# Patient Record
Sex: MALE | Race: WHITE | URBAN - METROPOLITAN AREA
[De-identification: names, ages, dates, MRNs, and addresses within clinical notes are randomized per-mention and may not be internally consistent; named-entity substitution may affect disease eponyms.]

---

## 2017-01-23 DIAGNOSIS — E10.8 TYPE 1 DIABETES MELLITUS WITH COMPLICATION (H): Primary | ICD-10-CM

## 2017-01-23 RX ORDER — LOSARTAN POTASSIUM 100 MG/1
100 TABLET ORAL DAILY
Qty: 90 TABLET | Refills: 1 | Status: SHIPPED | OUTPATIENT
Start: 2017-01-23 | End: 2017-06-16

## 2017-01-23 NOTE — TELEPHONE ENCOUNTER
losartan (COZAAR) 100 MG tablet    Last Written Prescription Date:  8/10/16  Last Fill Quantity: 90,   # refills: 1  Last Office Visit with G, P or [facility] prescribing provider: 4/14/15  Future Office visit:   7/3/17    Routing refill request to provider for review/approval because:   losartan (COZAAR) 100 MG tablet.  lv > 1 yr. F/u appt 7/3/17. rf?

## 2017-06-02 ENCOUNTER — TELEPHONE (OUTPATIENT)
Dept: ENDOCRINOLOGY | Facility: CLINIC | Age: 52
End: 2017-06-02

## 2017-06-02 DIAGNOSIS — E10.9 TYPE 1 DIABETES MELLITUS (H): ICD-10-CM

## 2017-06-02 RX ORDER — HUMAN INSULIN 100 [IU]/ML
INJECTION, SUSPENSION SUBCUTANEOUS
Qty: 10 ML | Refills: 0 | Status: SHIPPED | OUTPATIENT
Start: 2017-06-02 | End: 2017-06-16

## 2017-06-02 NOTE — TELEPHONE ENCOUNTER
----- Message from Cristina Xiang sent at 6/2/2017  3:39 PM CDT -----  Regarding: RX Refill request  Contact: [phone]  PT called today and said he had his pharmacy send over a refill request on for his NOVOLIN N VIAL 100 UNIT/ML susp and they informed him that he had to make an office visit before he would be approved for more fill. PT did have an appt set with Dr. Fleming on 7/3 and said he would be running out of his Insulin before that. I reschedule the 7/3 appt because PT was going to be out of town to next available opening in Dec and got him scheduled to see Lauren Palomino on 6/16 for the mean time but PT said he will still run out of insulin before then and is hoping to get a refill to get him through to 6/16 appt. PT is now using [facility] [address] [place] [address] [address] AT [facility] [address] Fax# [phone] and would like it sent there. PT requested a call back to confirm at [phone].    Thank You!  [facility]    Please DO NOT send this message and/or reply back to sender.  Call Center Representatives DO NOT respond to messages.

## 2017-06-02 NOTE — TELEPHONE ENCOUNTER
Lauren Palomino sent in 1 vial of NPH into [facility] and he will come in 6/16/17 for visit.

## 2017-06-16 ENCOUNTER — OFFICE VISIT (OUTPATIENT)
Dept: ENDOCRINOLOGY | Facility: CLINIC | Age: 52
End: 2017-06-16

## 2017-06-16 VITALS
DIASTOLIC BLOOD PRESSURE: 80 MMHG | SYSTOLIC BLOOD PRESSURE: 132 MMHG | BODY MASS INDEX: 33.04 KG/M2 | HEART RATE: 81 BPM | WEIGHT: 267.9 LBS

## 2017-06-16 DIAGNOSIS — E10.65 TYPE 1 DIABETES MELLITUS WITH HYPERGLYCEMIA (H): ICD-10-CM

## 2017-06-16 DIAGNOSIS — E10.8 TYPE 1 DIABETES MELLITUS WITH COMPLICATION (H): ICD-10-CM

## 2017-06-16 DIAGNOSIS — E10.65 TYPE 1 DIABETES MELLITUS WITH HYPERGLYCEMIA (H): Primary | ICD-10-CM

## 2017-06-16 LAB
CREAT SERPL-MCNC: 0.8 MG/DL (ref 0.66–1.25)
CREAT UR-MCNC: 37 MG/DL
GFR SERPL CREATININE-BSD FRML MDRD: NORMAL ML/MIN/1.7M2
HBA1C MFR BLD: 7.1 % (ref 4.3–6)
MICROALBUMIN UR-MCNC: <5 MG/L
MICROALBUMIN/CREAT UR: NORMAL MG/G CR (ref 0–17)
POTASSIUM SERPL-SCNC: 3.4 MMOL/L (ref 3.4–5.3)
T4 FREE SERPL-MCNC: 0.95 NG/DL (ref 0.76–1.46)
TSH SERPL DL<=0.05 MIU/L-ACNC: 2.74 MU/L (ref 0.4–4)

## 2017-06-16 RX ORDER — LOSARTAN POTASSIUM 100 MG/1
100 TABLET ORAL DAILY
Qty: 90 TABLET | Refills: 3 | Status: SHIPPED | OUTPATIENT
Start: 2017-06-16 | End: 2018-06-29

## 2017-06-16 RX ORDER — BLOOD-GLUCOSE METER
EACH MISCELLANEOUS
Qty: 1 KIT | Refills: 0 | Status: SHIPPED | OUTPATIENT
Start: 2017-06-16 | End: 2018-10-09

## 2017-06-16 RX ORDER — LANCETS 33 GAUGE
1 EACH MISCELLANEOUS 4 TIMES DAILY
Qty: 400 EACH | Refills: 3 | Status: SHIPPED | OUTPATIENT
Start: 2017-06-16

## 2017-06-16 RX ORDER — HUMAN INSULIN 100 [IU]/ML
INJECTION, SUSPENSION SUBCUTANEOUS
Qty: 10 ML | Refills: 3 | Status: SHIPPED | OUTPATIENT
Start: 2017-06-16 | End: 2017-09-06

## 2017-06-16 RX ORDER — NAPROXEN SODIUM 220 MG
TABLET ORAL
Qty: 100 EACH | Refills: 11 | Status: SHIPPED | OUTPATIENT
Start: 2017-06-16 | End: 2018-12-03

## 2017-06-16 NOTE — PROGRESS NOTES
HPI  Pascual Swanson is a 52 year old male with type 1 diabetes mellitus here today for a follow up visit.  He has not been seen in our clinic since 2015.  He saw Dr. Milton Fleming in 4/2015.  Pt states he was unemployed for awhile and was unable to afford to go to his appointments.  Pt states he was dx having type 1 diabetes mellitus at age 29.  He denies hx of retinopathy, nephropathy and neuropathy.  For his diabetes, he is using NPH and Novolog insulin.  He states he can not afford Lantus or the other basal insulins and he is unable to afford an insulin pump at this time.  He states he is taking NPH 30 units SQ each am and 30 units SQ at bedtime, along with Novolog 14 units with meals and he also takes Novolog for snacks and correction insulin.  He has no glucose meter today.  His A1C is 7.1 % today.  He had an A1C done at [facility] on 5/24/2017 which was 7.7 %.  He denies hx of frequent hypoglycemia.  He states his FBS are in the 140-150 range and his blood sugars later in the day are 100-120 range.  On ROS today, weight is down 10 lbs over the past 2 months which was intentional with dietary changes and exercise.  He has been swimming 3 times per week.  Eyes are dry and irritated since swimming. No itchy eyes.  Lower extremity edema ( > right ) for approx 1 year with increase swelling recently for the past 3 months.  He was seen by primary care physician 2 weeks ago and had tests done including cardiac echo which was normal per patient.  He was prescribed Lasix and swelling reduced and then returned after he stopped the Lasix and he is now back on Lasix.  No warm or drainage or fevers right lower leg.  He reports chronic cough from allergies.  Pt has hx of sleep apnea and will be seeing his sleep apnea staff next week.  Pt denies n/v, SOB at rest, chest pain, abd pain, diarrhea, dysuria or hematuria.  No numbness, tingling or pain in feet or hands.  No fevers.    Diabetes Care  Retinopathy:none; pt seen by Oph 1  "year ago.  Nephropathy:none. He is taking Cozaar.  Neuropathy:none.  Foot Exam:no ulcers. LE edema ( > right ). Sensory intact.  Taking aspirin:no.  Lipids:LDL was 50 on 5/242017 at [facility].    ROS  Please see under HPI.    Allergies  Allergies   Allergen Reactions     Bactrim [Sulfamethoxazole W/Trimethoprim] Hives     Hives in eyes     Dust Mites        Medications  Current Outpatient Prescriptions   Medication Sig Dispense Refill     NOVOLIN N VIAL 100 UNIT/ML susp 22 units before breakfast 30 units before bedtime 10 mL 0     losartan (COZAAR) 100 MG tablet Take 1 tablet (100 mg) by mouth daily 90 tablet 1     insulin aspart (NOVOLOG VIAL) 100 UNITS/ML VIAL Use in pump basals plus corrections Approx 130 units daily needs to be seen for future fills 12 vial 1     insulin syringe 31G X 5/16\" 0.5 ML MISC Use twice daily 100 each 11     blood glucose (TRINA CONTOUR NEXT) test strip For use with insulin pump, patient testing 10x/day 300 Month 12     acetaminophen (TYLENOL) 325 MG tablet Take 2 tablets (650 mg) by mouth every 4 hours as needed for other (mild pain) (Patient not taking: Reported on 6/16/2017) 100 tablet 0     oxyCODONE (ROXICODONE) 5 MG immediate release tablet Take 1-2 tablets (5-10 mg) by mouth every 3 hours as needed for pain or other (Moderate to Severe) (Patient not taking: Reported on 6/16/2017) 40 tablet 0     senna-docusate (SENOKOT-S;PERICOLACE) 8.6-50 MG per tablet Take 1-2 tablets by mouth 2 times daily Take while on oral narcotics to prevent or treat constipation. (Patient not taking: Reported on 6/16/2017) 30 tablet 0     Naproxen Sodium (ALEVE PO) Take 220 mg by mouth as needed for moderate pain       NOVOLOG FLEXPEN 100 UNIT/ML soln Take 12-18 units before meals 1 Month 11     insulin pen needle 31G X 5 MM Use pen needles daily or as directed. (Patient not taking: Reported on 6/16/2017) 100 each 11     ACCU-CHEK FIOR test strip Use to test blood sugar 4 times daily or as directed. 120 " strip 11     omeprazole (PRILOSEC OTC) 20 MG tablet Take by mouth every evening        glucose blood VI test strips (ACCU-CHEK SMARTVIEW) strip Test 4 times daily 400 strip 3       Family History  family history is negative for Anesthesia Reaction.    Social History  Smoke: none.  He has 4 daughters.  He is working- sales/desk job.    Past Medical History  Past Medical History:   Diagnosis Date     Alcohol abuse, in remission      GERD (gastroesophageal reflux disease)      Hypertension      Sleep apnea      Type 1 diabetes mellitus (H)      Past Surgical History:   Procedure Laterality Date     APPENDECTOMY  Age 13     EXCISE GANGLION FOOT Right 4/6/2015    Procedure: EXCISE GANGLION FOOT;  Surgeon: Neto Crabtree MD;  Location: [facility] OR     EXTRACTION(S) DENTAL       HERNIA REPAIR  Age 3     IRRIGATION AND DEBRIDEMENT FOOT, COMBINED Right 5/11/2015    Procedure: COMBINED IRRIGATION AND DEBRIDEMENT FOOT;  Surgeon: Neto Crabtree MD;  Location: [facility] OR       Physical Exam  BP (!) 153/94  Pulse 81  Wt 121.5 kg (267 lb 14.4 oz)  BMI 33.04 kg/m2  Body mass index is 33.04 kg/(m^2).    GENERAL : In no apparent distress  EXTREMITIES: LE edema b/l ( > right ). No warmth or drainage.  No calf tenderness and no foot ulcers.  Sensory intact.      RESULTS  Creatinine   Date Value Ref Range Status   04/14/2015 0.86 0.66 - 1.25 mg/dL Final     GFR Estimate   Date Value Ref Range Status   04/14/2015 >90  Non  GFR Calc   >60 mL/min/1.7m2 Final     Hemoglobin A1C   Date Value Ref Range Status   03/23/2015 9.1 (H) 4.1 - 5.7 % Final     Potassium   Date Value Ref Range Status   04/14/2015 3.3 (L) 3.4 - 5.3 mmol/L Final     TSH   Date Value Ref Range Status   04/14/2015 2.11 0.40 - 4.00 mU/L Final       Cholesterol   Date Value Ref Range Status   11/21/2013 140 0 - 200 mg/dL Final     Comment:     LDL Cholesterol is the primary guide to therapy.   The NCEP recommends further evaluation of: patients  with cholesterol greater   than 200 mg/dL if additional risk factors are present, cholesterol greater   than   240 mg/dL, triglycerides greater than 150 mg/dL, or HDL less than 40 mg/dL.   06/30/2011 150 0 - 200 mg/dL Final     Comment:     LDL Cholesterol is the primary guide to therapy.   The NCEP recommends further evaluation of: patients with cholesterol greater   than 200 mg/dL if additional risk factors are present, cholesterol greater   than   240 mg/dL, triglycerides greater than 150 mg/dL, or HDL less than 40 mg/dL.     HDL Cholesterol   Date Value Ref Range Status   11/21/2013 53 40 - 110 mg/dL Final   06/30/2011 71 40 - 110 mg/dL Final     LDL Cholesterol Calculated   Date Value Ref Range Status   11/21/2013 66 0 - 129 mg/dL Final     Comment:     LDL Cholesterol is the primary guide to therapy: LDL-cholesterol goal in high   risk patients is <100 mg/dL and in very high risk patients is <70 mg/dL.   06/30/2011 67 0 - 129 mg/dL Final     Comment:     LDL Cholesterol is the primary guide to therapy: LDL-cholesterol goal in high   risk patients is <100 mg/dL and in very high risk patients is <70 mg/dL.     LDL Cholesterol Direct   Date Value Ref Range Status   04/14/2015 53 0 - 129 mg/dL Final     Comment:     Optimal:         <100 mg/dL   Near Optimal:     100-129 mg/dL   Borderline High:  130-159 mg/dL   High:             160-189 mg/dL   Very high:  greater than or equal to 190 mg/dL   Cannot estimate LDL when triglyceride exceeds 400 mg/dL       Triglycerides   Date Value Ref Range Status   11/21/2013 107 0 - 150 mg/dL Final     Comment:     Fasting specimen   06/30/2011 61 0 - 150 mg/dL Final     Comment:     Fasting specimen     Cholesterol/HDL Ratio   Date Value Ref Range Status   11/21/2013 2.7 0.0 - 5.0 Final   06/30/2011 2.1 0.0 - 5.0 Final     A1C   7.1 % today.    ASSESSMENT/PLAN:    1. TYPE 1 DIABETES MELLITUS: Pt did not bring his glucose meter today, so I have no blood sugar data.  His A1C is  7.1 % today.  He is using NPH for financial reasons. He can not afford Lantus or other basal insulins at this time.  Pt is to remain on current insulin doses.  He is to schedule an Oph exam.  Feet are without ulcers today.  I placed an order for labs today including a urine microalbuminuria.  His LDL was 50 on 5/24/2017 at [facility].  He is not taking a statin.  Check TSH/FT4 today.  I placed a referral for pt to see CDE and dietitian.    2. HTN:  /80.  He is taking Cozaar and Lasix.  Consider switching to Hyzaar.  Checking creat/GFR and K+ today.    3.  OBESITY: He has lost weight with dietary changes and exercise.    4.  LE EDEMA: Pt seen at [facility] for this issue and has f/u .  He did have an echo done.  Suggested that right lower leg US may need to be done.  He is taking Lasix.    5.  Return to Endocrine Clinic to see me in 3 months and he will be seeing Dr. Zoey Fleming in Dec 2017.

## 2017-06-16 NOTE — LETTER
6/16/2017       RE: Pascual Swanson  [address] [place] [address]     Dear Colleague,    Thank you for referring your patient, Pascual Swanson, to the [facility] ENDOCRINOLOGY at [facility]. Please see a copy of my visit note below.    DANIEL Swanson is a 52 year old male with type 1 diabetes mellitus here today for a follow up visit.  He has not been seen in our clinic since 2015.  He saw Dr. Milton Fleming in 4/2015.  Pt states he was unemployed for awhile and was unable to afford to go to his appointments.  Pt states he was dx having type 1 diabetes mellitus at age 29.  He denies hx of retinopathy, nephropathy and neuropathy.  For his diabetes, he is using NPH and Novolog insulin.  He states he can not afford Lantus or the other basal insulins and he is unable to afford an insulin pump at this time.  He states he is taking NPH 30 units SQ each am and 30 units SQ at bedtime, along with Novolog 14 units with meals and he also takes Novolog for snacks and correction insulin.  He has no glucose meter today.  His A1C is 7.1 % today.  He had an A1C done at [facility] on 5/24/2017 which was 7.7 %.  He denies hx of frequent hypoglycemia.  He states his FBS are in the 140-150 range and his blood sugars later in the day are 100-120 range.  On ROS today, weight is down 10 lbs over the past 2 months which was intentional with dietary changes and exercise.  He has been swimming 3 times per week.  Eyes are dry and irritated since swimming. No itchy eyes.  Lower extremity edema ( > right ) for approx 1 year with increase swelling recently for the past 3 months.  He was seen by primary care physician 2 weeks ago and had tests done including cardiac echo which was normal per patient.  He was prescribed Lasix and swelling reduced and then returned after he stopped the Lasix and he is now back on Lasix.  No warm or drainage or fevers right lower leg.  He reports chronic cough from  "allergies.  Pt has hx of sleep apnea and will be seeing his sleep apnea staff next week.  Pt denies n/v, SOB at rest, chest pain, abd pain, diarrhea, dysuria or hematuria.  No numbness, tingling or pain in feet or hands.  No fevers.    Diabetes Care  Retinopathy:none; pt seen by Oph 1 year ago.  Nephropathy:none. He is taking Cozaar.  Neuropathy:none.  Foot Exam:no ulcers. LE edema ( > right ). Sensory intact.  Taking aspirin:no.  Lipids:LDL was 50 on 5/242017 at [facility].    ROS  Please see under HPI.    Allergies  Allergies   Allergen Reactions     Bactrim [Sulfamethoxazole W/Trimethoprim] Hives     Hives in eyes     Dust Mites        Medications  Current Outpatient Prescriptions   Medication Sig Dispense Refill     NOVOLIN N VIAL 100 UNIT/ML susp 22 units before breakfast 30 units before bedtime 10 mL 0     losartan (COZAAR) 100 MG tablet Take 1 tablet (100 mg) by mouth daily 90 tablet 1     insulin aspart (NOVOLOG VIAL) 100 UNITS/ML VIAL Use in pump basals plus corrections Approx 130 units daily needs to be seen for future fills 12 vial 1     insulin syringe 31G X 5/16\" 0.5 ML MISC Use twice daily 100 each 11     blood glucose (TRINA CONTOUR NEXT) test strip For use with insulin pump, patient testing 10x/day 300 Month 12     acetaminophen (TYLENOL) 325 MG tablet Take 2 tablets (650 mg) by mouth every 4 hours as needed for other (mild pain) (Patient not taking: Reported on 6/16/2017) 100 tablet 0     oxyCODONE (ROXICODONE) 5 MG immediate release tablet Take 1-2 tablets (5-10 mg) by mouth every 3 hours as needed for pain or other (Moderate to Severe) (Patient not taking: Reported on 6/16/2017) 40 tablet 0     senna-docusate (SENOKOT-S;PERICOLACE) 8.6-50 MG per tablet Take 1-2 tablets by mouth 2 times daily Take while on oral narcotics to prevent or treat constipation. (Patient not taking: Reported on 6/16/2017) 30 tablet 0     Naproxen Sodium (ALEVE PO) Take 220 mg by mouth as needed for moderate pain       NOVOLOG " FLEXPEN 100 UNIT/ML soln Take 12-18 units before meals 1 Month 11     insulin pen needle 31G X 5 MM Use pen needles daily or as directed. (Patient not taking: Reported on 6/16/2017) 100 each 11     ACCU-CHEK FIOR test strip Use to test blood sugar 4 times daily or as directed. 120 strip 11     omeprazole (PRILOSEC OTC) 20 MG tablet Take by mouth every evening        glucose blood VI test strips (ACCU-CHEK SMARTVIEW) strip Test 4 times daily 400 strip 3       Family History  family history is negative for Anesthesia Reaction.    Social History  Smoke: none.  He has 4 daughters.  He is working- sales/desk job.    Past Medical History  Past Medical History:   Diagnosis Date     Alcohol abuse, in remission      GERD (gastroesophageal reflux disease)      Hypertension      Sleep apnea      Type 1 diabetes mellitus (H)      Past Surgical History:   Procedure Laterality Date     APPENDECTOMY  Age 13     EXCISE GANGLION FOOT Right 4/6/2015    Procedure: EXCISE GANGLION FOOT;  Surgeon: Neto Crabtree MD;  Location: [facility]     EXTRACTION(S) DENTAL       HERNIA REPAIR  Age 3     IRRIGATION AND DEBRIDEMENT FOOT, COMBINED Right 5/11/2015    Procedure: COMBINED IRRIGATION AND DEBRIDEMENT FOOT;  Surgeon: Neto Crabtree MD;  Location: [facility] OR       Physical Exam  BP (!) 153/94  Pulse 81  Wt 121.5 kg (267 lb 14.4 oz)  BMI 33.04 kg/m2  Body mass index is 33.04 kg/(m^2).    GENERAL : In no apparent distress  EXTREMITIES: LE edema b/l ( > right ). No warmth or drainage.  No calf tenderness and no foot ulcers.  Sensory intact.      RESULTS  Creatinine   Date Value Ref Range Status   04/14/2015 0.86 0.66 - 1.25 mg/dL Final     GFR Estimate   Date Value Ref Range Status   04/14/2015 >90  Non  GFR Calc   >60 mL/min/1.7m2 Final     Hemoglobin A1C   Date Value Ref Range Status   03/23/2015 9.1 (H) 4.1 - 5.7 % Final     Potassium   Date Value Ref Range Status   04/14/2015 3.3 (L) 3.4 - 5.3 mmol/L Final      TSH   Date Value Ref Range Status   04/14/2015 2.11 0.40 - 4.00 mU/L Final       Cholesterol   Date Value Ref Range Status   11/21/2013 140 0 - 200 mg/dL Final     Comment:     LDL Cholesterol is the primary guide to therapy.   The NCEP recommends further evaluation of: patients with cholesterol greater   than 200 mg/dL if additional risk factors are present, cholesterol greater   than   240 mg/dL, triglycerides greater than 150 mg/dL, or HDL less than 40 mg/dL.   06/30/2011 150 0 - 200 mg/dL Final     Comment:     LDL Cholesterol is the primary guide to therapy.   The NCEP recommends further evaluation of: patients with cholesterol greater   than 200 mg/dL if additional risk factors are present, cholesterol greater   than   240 mg/dL, triglycerides greater than 150 mg/dL, or HDL less than 40 mg/dL.     HDL Cholesterol   Date Value Ref Range Status   11/21/2013 53 40 - 110 mg/dL Final   06/30/2011 71 40 - 110 mg/dL Final     LDL Cholesterol Calculated   Date Value Ref Range Status   11/21/2013 66 0 - 129 mg/dL Final     Comment:     LDL Cholesterol is the primary guide to therapy: LDL-cholesterol goal in high   risk patients is <100 mg/dL and in very high risk patients is <70 mg/dL.   06/30/2011 67 0 - 129 mg/dL Final     Comment:     LDL Cholesterol is the primary guide to therapy: LDL-cholesterol goal in high   risk patients is <100 mg/dL and in very high risk patients is <70 mg/dL.     LDL Cholesterol Direct   Date Value Ref Range Status   04/14/2015 53 0 - 129 mg/dL Final     Comment:     Optimal:         <100 mg/dL   Near Optimal:     100-129 mg/dL   Borderline High:  130-159 mg/dL   High:             160-189 mg/dL   Very high:  greater than or equal to 190 mg/dL   Cannot estimate LDL when triglyceride exceeds 400 mg/dL       Triglycerides   Date Value Ref Range Status   11/21/2013 107 0 - 150 mg/dL Final     Comment:     Fasting specimen   06/30/2011 61 0 - 150 mg/dL Final     Comment:     Fasting specimen      Cholesterol/HDL Ratio   Date Value Ref Range Status   11/21/2013 2.7 0.0 - 5.0 Final   06/30/2011 2.1 0.0 - 5.0 Final     A1C   7.1 % today.    ASSESSMENT/PLAN:    1. TYPE 1 DIABETES MELLITUS: Pt did not bring his glucose meter today, so I have no blood sugar data.  His A1C is 7.1 % today.  He is using NPH for financial reasons. He can not afford Lantus or other basal insulins at this time.  Pt is to remain on current insulin doses.  He is to schedule an Oph exam.  Feet are without ulcers today.  I placed an order for labs today including a urine microalbuminuria.  His LDL was 50 on 5/24/2017 at [facility].  He is not taking a statin.  Check TSH/FT4 today.  I placed a referral for pt to see CDE and dietitian.    2. HTN:  /80.  He is taking Cozaar and Lasix.  Consider switching to Hyzaar.  Checking creat/GFR and K+ today.    3.  OBESITY: He has lost weight with dietary changes and exercise.    4.  LE EDEMA: Pt seen at [facility] for this issue and has f/u .  He did have an echo done.  Suggested that right lower leg US may need to be done.  He is taking Lasix.    5.  Return to Endocrine Clinic to see me in 3 months and he will be seeing Dr. Zoey Fleming in Dec 2017.      Lauren Palomino PA-C

## 2017-06-16 NOTE — PATIENT INSTRUCTIONS
1.  Check your blood sugar fasting each am and premeals DAILY.  Bring glucose meter to appointments.  2.  Continue current insulin doses for now.  3.  Schedule eye appointment.  4.  See our diabetes educator.  5.  See your primary care physician for follow up for lower leg edema.  6.  Labs today- I will send you a result letter.  Lauren Palomino PA-C

## 2017-06-16 NOTE — MR AVS SNAPSHOT
After Visit Summary   6/16/2017    Pascual Swanson    MRN: [id number]           Patient Information     Date Of Birth          1965        Visit Information        Provider Department      6/16/2017 9:00 AM Lauren Palomino PA-C [facility] Endocrinology        Today's Diagnoses     Type 1 diabetes mellitus with hyperglycemia (H)    -  1    Type 1 diabetes mellitus with complication (H)          Care Instructions    1.  Check your blood sugar fasting each am and premeals DAILY.  Bring glucose meter to appointments.  2.  Continue current insulin doses for now.  3.  Schedule eye appointment.  4.  See our diabetes educator.  5.  See your primary care physician for follow up for lower leg edema.  6.  Labs today- I will send you a result letter.  Lauren Palomino PA-C          Follow-ups after your visit        Your next 10 appointments already scheduled     Jun 16, 2017  9:45 AM CDT   LAB with [facility]   [facility] Lab ([facility])    [address] [place] [id number] [phone]           Patient must bring picture ID.  Patient should be prepared to give a urine specimen  Please do not eat 10-12 hours before your appointment if you are coming in fasting for labs on lipids, cholesterol, or glucose (sugar).  Pregnant women should follow their Care Team instructions. Water with medications is okay. Do not drink coffee or other fluids.   If you have concerns about taking  your medications, please ask at office or if scheduling via [employer], send a message by clicking on Secure Messaging, Message Your Care Team.            Jul 14, 2017  8:30 AM CDT   (Arrive by 8:15 AM)   Office Visit with Amalia Lockwood RD   [facility] Diabetes ([facility])    [address] [place] [id number] [phone]           Bring a current list of meds and any records pertaining to this visit.  For Physicals, please bring  immunization records and any forms needing to be filled out.  Please arrive 10 minutes early to complete paperwork.            Aug 28, 2017  8:30 AM CDT   (Arrive by 8:15 AM)   Office Visit with Luciana Kenyon RN   [facility] Diabetes ([facility])    [address] [place], [place] [id number] [phone]           Bring a current list of meds and any records pertaining to this visit.  For Physicals, please bring immunization records and any forms needing to be filled out.  Please arrive 10 minutes early to complete paperwork.            Aug 28, 2017  9:30 AM CDT   (Arrive by 9:15 AM)   Office Visit with Amalia Lockwood RD   [facility] Diabetes ([facility])    [address] [place] [id number] [phone]           Bring a current list of meds and any records pertaining to this visit.  For Physicals, please bring immunization records and any forms needing to be filled out.  Please arrive 10 minutes early to complete paperwork.            Dec 04, 2017  2:30 PM CST   (Arrive by 2:15 PM)   RETURN DIABETES with Zoey Fleming MD   [facility] Endocrinology ([facility])    [address] [place] [id number] [phone]              Future tests that were ordered for you today     Open Future Orders        Priority Expected Expires Ordered    Potassium Routine  6/16/2018 6/16/2017    Creatinine Routine  6/16/2018 6/16/2017    TSH Routine  6/16/2018 6/16/2017    T4 free Routine  6/16/2018 6/16/2017            Who to contact     Please call your clinic at [phone] to:    Ask questions about your health    Make or cancel appointments    Discuss your medicines    Learn about your test results    Speak to your doctor   If you have compliments or concerns about an experience at your clinic, or if you wish to file a complaint, please contact [facility]  Patient Relations at [phone] or email us at [email or web address]         Additional Information About Your Visit        [employer] Information     [employer] is an electronic gateway that provides easy, online access to your medical records. With [employer], you can request a clinic appointment, read your test results, renew a prescription or communicate with your care team.     To sign up for [employer] visit the website at [email or web address]   You will be asked to enter the access code listed below, as well as some personal information. Please follow the directions to create your username and password.     Your access code is: [email or web address]  Expires: 9/3/2017  6:31 AM     Your access code will  in 90 days. If you need help or a new code, please contact your [facility] or call [phone] for assistance.        Care EveryWhere ID     This is your Care EveryWhere ID. This could be used by other organizations to access your [place] medical records  [id number]        Your Vitals Were     Pulse BMI (Body Mass Index)                81 33.04 kg/m2           Blood Pressure from Last 3 Encounters:   17 132/80   06/05/15 138/90   05/11/15 143/89    Weight from Last 3 Encounters:   17 121.5 kg (267 lb 14.4 oz)   06/05/15 115.5 kg (254 lb 9.6 oz)   05/11/15 113.6 kg (250 lb 7.1 oz)              We Performed the Following     Albumin Random Urine Quantitative          Today's Medication Changes          These changes are accurate as of: 17  9:41 AM.  If you have any questions, ask your nurse or doctor.               These medicines have changed or have updated prescriptions.        Dose/Directions    insulin aspart 100 UNITS/ML injection   Commonly known as:  NovoLOG VIAL   This may have changed:    - additional instructions  - Another medication with the same name was removed. Continue taking this medication, and follow the directions you see here.  "  Used for:  Type 1 diabetes mellitus with hyperglycemia (H)   Changed by:  Lauren Palomino PA-C        Inject premeals and for snacks and correction . Pt uses approx 60 units in 24 hrs.   Quantity:  6 vial   Refills:  3       NovoLIN N VIAL 100 UNIT/ML injection   This may have changed:  additional instructions   Used for:  Type 1 diabetes mellitus with hyperglycemia (H)   Generic drug:  insulin NPH   Changed by:  Lauren Palomino PA-C        Inject 30 units SQ each am and 30 units SQ at bedtime.   Quantity:  10 mL   Refills:  3         Stop taking these medicines if you haven't already. Please contact your care team if you have questions.     acetaminophen 325 MG tablet   Commonly known as:  TYLENOL   Stopped by:  Lauren Palomino PA-C           ALEVE PO   Stopped by:  Lauren Palomino PA-C           insulin pen needle 31G X 5 MM   Stopped by:  Lauren Palomino PA-C           oxyCODONE 5 MG IR tablet   Commonly known as:  ROXICODONE   Stopped by:  Lauren Palomino PA-C                Where to get your medicines      These medications were sent to [email or web address] [address] [facility] [address] [facility] [address]  [address] [facility] [address]    Hours:  24-hours Phone:  [phone]     insulin aspart 100 UNITS/ML injection    insulin syringe 31G X 5/16\" 0.5 ML Misc    losartan 100 MG tablet    NovoLIN N VIAL 100 UNIT/ML injection                Primary Care Provider    None Specified       No primary provider on file.        Thank you!     Thank you for choosing [facility] ENDOCRINOLOGY  for your care. Our goal is always to provide you with excellent care. Hearing back from our patients is one way we can continue to improve our services. Please take a few minutes to complete the written survey that you may receive in the mail after your visit with us. Thank you!             Your Updated Medication List - Protect others around you: " "Learn how to safely use, store and throw away your medicines at www.disposemymeds.org.          This list is accurate as of: 6/16/17  9:41 AM.  Always use your most recent med list.                   Brand Name Dispense Instructions for use    * blood glucose monitoring test strip    ACCU-CHEK SMARTVIEW    400 strip    Test 4 times daily       * ACCU-CHEK FIOR test strip   Generic drug:  blood glucose monitoring     120 strip    Use to test blood sugar 4 times daily or as directed.       * blood glucose monitoring test strip    TRINA CONTOUR NEXT    300 Month    For use with insulin pump, patient testing 10x/day       insulin aspart 100 UNITS/ML injection    NovoLOG VIAL    6 vial    Inject premeals and for snacks and correction . Pt uses approx 60 units in 24 hrs.       insulin syringe 31G X 5/16\" 0.5 ML Misc     100 each    Use twice daily       losartan 100 MG tablet    COZAAR    90 tablet    Take 1 tablet (100 mg) by mouth daily       NovoLIN N VIAL 100 UNIT/ML injection   Generic drug:  insulin NPH     10 mL    Inject 30 units SQ each am and 30 units SQ at bedtime.       priLOSEC OTC 20 MG tablet   Generic drug:  omeprazole      Take by mouth every evening       senna-docusate 8.6-50 MG per tablet    SENOKOT-S;PERICOLACE    30 tablet    Take 1-2 tablets by mouth 2 times daily Take while on oral narcotics to prevent or treat constipation.       * Notice:  This list has 3 medication(s) that are the same as other medications prescribed for you. Read the directions carefully, and ask your doctor or other care provider to review them with you.      "

## 2017-09-06 DIAGNOSIS — E10.65 TYPE 1 DIABETES MELLITUS WITH HYPERGLYCEMIA (H): ICD-10-CM

## 2017-09-06 RX ORDER — HUMAN INSULIN 100 [IU]/ML
INJECTION, SUSPENSION SUBCUTANEOUS
Qty: 10 ML | Refills: 3 | Status: SHIPPED | OUTPATIENT
Start: 2017-09-06 | End: 2017-09-07

## 2017-09-07 DIAGNOSIS — E10.9 TYPE 1 DIABETES MELLITUS (H): Primary | ICD-10-CM

## 2017-09-07 DIAGNOSIS — E10.9 DIABETES MELLITUS TYPE 1 (H): Primary | ICD-10-CM

## 2017-12-04 ENCOUNTER — OFFICE VISIT (OUTPATIENT)
Dept: ENDOCRINOLOGY | Facility: CLINIC | Age: 52
End: 2017-12-04

## 2017-12-04 VITALS
HEIGHT: 76 IN | BODY MASS INDEX: 31.78 KG/M2 | WEIGHT: 261 LBS | SYSTOLIC BLOOD PRESSURE: 158 MMHG | DIASTOLIC BLOOD PRESSURE: 95 MMHG

## 2017-12-04 DIAGNOSIS — I10 ESSENTIAL HYPERTENSION: ICD-10-CM

## 2017-12-04 DIAGNOSIS — E10.8 TYPE 1 DIABETES MELLITUS WITH COMPLICATION (H): Primary | ICD-10-CM

## 2017-12-04 LAB — HBA1C MFR BLD: 7.1 % (ref 4.3–6)

## 2017-12-04 RX ORDER — INSULIN GLARGINE 100 [IU]/ML
54 INJECTION, SOLUTION SUBCUTANEOUS DAILY
Qty: 30 ML | Refills: 3 | Status: SHIPPED | OUTPATIENT
Start: 2017-12-04 | End: 2018-07-11

## 2017-12-04 RX ORDER — CETIRIZINE HYDROCHLORIDE 10 MG/1
10 TABLET ORAL DAILY
COMMUNITY

## 2017-12-04 RX ORDER — LISDEXAMFETAMINE DIMESYLATE 10 MG/1
10 CAPSULE ORAL EVERY MORNING
COMMUNITY

## 2017-12-04 RX ORDER — DEXTROAMPHETAMINE SULFATE 10 MG/1
10 TABLET ORAL DAILY
COMMUNITY

## 2017-12-04 RX ORDER — AMLODIPINE BESYLATE 5 MG/1
5 TABLET ORAL DAILY
Qty: 90 TABLET | Refills: 3 | Status: SHIPPED | OUTPATIENT
Start: 2017-12-04 | End: 2018-11-08

## 2017-12-04 RX ORDER — FUROSEMIDE 20 MG/1
10 TABLET ORAL DAILY
COMMUNITY

## 2017-12-04 ASSESSMENT — PAIN SCALES - GENERAL: PAINLEVEL: NO PAIN (0)

## 2017-12-04 NOTE — NURSING NOTE
Chief Complaint   Patient presents with     RECHECK     F/U TYPE I DM     Audrey Neumann, CMA  Endocrinology & Diabetes 3G    Capillary Fingerstick performed for an Hemoglobin A1C test

## 2017-12-04 NOTE — MR AVS SNAPSHOT
After Visit Summary   12/4/2017    Pascual Swanson    MRN: [id number]           Patient Information     Date Of Birth          1965        Visit Information        Provider Department      12/4/2017 2:30 PM Zoey Fleming MD [facility] Endocrinology        Today's Diagnoses     Type 1 diabetes mellitus with complication (H)    -  1    Essential hypertension           Follow-ups after your visit        Additional Services     DIABETES EDUCATOR REFERRAL       DIABETES SELF MANAGEMENT TRAINING (DSMT)      Your provider has referred you to Diabetes Education: [facility]: Diabetes Education - [facility] [phone] [email or web address]    A  will call you to make your appointment. If it has been more than 3 business days since your referral was placed, please call the above phone number to schedule.    Type of training and number of hours: Previous Diagnosis: Follow-up DSMT - 2 hours.    Medicare covers: 10 hours of initial DSMT in 12 month period from the time of first visit, plus 2 hours of follow-up DSMT annually, and additional hours as requested for insulin training.    Diabetes Type: Type 1             Diabetes Co-Morbidities: hypertension, neuropathy and obesity               A1C Goal:  <7.0       A1C is: Lab Results       Component                Value               Date                       A1C                      9.1                 03/23/2015              Diabetes Education Topics: Insulin Pump Therapy: Pre-Pump Start Education    Special Educational Needs Requiring Individual DSMT: None       MEDICAL NUTRITION THERAPY (MNT) for Diabetes    Medical Nutrition Therapy with a Registered Dietitian can be provided in coordination with Diabetes Self-Management Training to assist in achieving optimal diabetes management.    MNT Type and Hours: Do not initiate MNT at this time.                        Medicare will cover: 3 hours initial MNT in 12 month period after first visit, plus 2 hours of follow-up MNT annually    Please be aware that coverage of these services is subject to the terms and limitations of your health insurance plan.  Call member services at your health plan to determine Diabetes Self-Management Training (Codes  &amp; ) and Medical Nutrition Therapy (Codes 92058 & 29850) benefits and ask which blood glucose monitor brands are covered by your plan.  Please bring the following with you to your appointment:    (1)  List of current medications   (2)  List of Blood Glucose Monitor brands that are covered by your insurance plan  (3)  Blood Glucose Monitor and log book  (4)   Food records for the 3 days prior to your visit    The Certified Diabetes Educator may make diabetes medication adjustments per the CDE Protocol and Collaborative Practice Agreement.                  Follow-up notes from your care team     Return in about 6 months (around 6/4/2018).      Your next 10 appointments already scheduled     Jan 08, 2018  5:30 PM CST   (Arrive by 5:15 PM)   Office Visit with Soraya Corbin RN   [facility] Diabetes ([facility])    [address] [place] [id number] [phone]           Bring a current list of meds and any records pertaining to this visit. For Physicals, please bring immunization records and any forms needing to be filled out. Please arrive 10 minutes early to complete paperwork.            Jun 18, 2018  4:30 PM CDT   (Arrive by 4:15 PM)   RETURN DIABETES with Zoey Fleming MD   [facility] Endocrinology ([facility])    [address] [place] [address] [phone]              Who to contact     Please call your clinic at [phone] to:    Ask questions about your health    Make or cancel appointments    Discuss your medicines    Learn about  "your test results    Speak to your doctor   If you have compliments or concerns about an experience at your clinic, or if you wish to file a complaint, please contact [facility] Patient Relations at [phone] or email us at [email or web address]         Additional Information About Your Visit        [employer] Information     [employer] is an electronic gateway that provides easy, online access to your medical records. With [employer], you can request a clinic appointment, read your test results, renew a prescription or communicate with your care team.     To sign up for [employer] visit the website at [email or web address]   You will be asked to enter the access code listed below, as well as some personal information. Please follow the directions to create your username and password.     Your access code is: [email or web address]  Expires: 2018  6:31 AM     Your access code will  in 90 days. If you need help or a new code, please contact your [facility] or call [phone] for assistance.        Care EveryWhere ID     This is your Care EveryWhere ID. This could be used by other organizations to access your [place] medical records  [id number]        Your Vitals Were     Height BMI (Body Mass Index)                1.918 m (6' 3.5\") 32.19 kg/m2           Blood Pressure from Last 3 Encounters:   17 (!) 158/95   17 132/80   06/05/15 138/90    Weight from Last 3 Encounters:   17 118.4 kg (261 lb)   17 121.5 kg (267 lb 14.4 oz)   06/05/15 115.5 kg (254 lb 9.6 oz)              We Performed the Following     DIABETES EDUCATOR REFERRAL          Today's Medication Changes          These changes are accurate as of: 17  3:33 PM.  If you have any questions, ask your nurse or doctor.               Start taking these medicines.        Dose/Directions    amLODIPine 5 MG tablet   Commonly known as:  NORVASC   Used for:  Type 1 " diabetes mellitus with complication (H)   Started by:  Zoey Fleming MD        Dose:  5 mg   Take 1 tablet (5 mg) by mouth daily   Quantity:  90 tablet   Refills:  3       BASAGLAR 100 UNIT/ML injection   Used for:  Type 1 diabetes mellitus with complication (H)   Started by:  Zoey Fleming MD        Dose:  54 Units   Inject 54 Units Subcutaneous daily   Quantity:  30 mL   Refills:  3         Stop taking these medicines if you haven't already. Please contact your care team if you have questions.     senna-docusate 8.6-50 MG per tablet   Commonly known as:  SENOKOT-S;PERICOLACE   Stopped by:  Zoey Fleming MD                Where to get your medicines      These medications were sent to [facility]/pharmacy #[id number] [address] [address]     Phone:  [phone]     amLODIPine 5 MG tablet    BASAGLAR 100 UNIT/ML injection                Primary Care Provider    None Specified       No primary provider on file.        Equal Access to Services     [facility]: Hadii vivienne ku hadasho Soomaali, waaxda luqadaha, qaybta kaalmada adeegyada, waxay chrsitie nguyen . So [facility] [phone].    ATENCIÓN: Si habla español, tiene a [place] disposición servicios gratuitos de asistencia lingüística. LlC[facility] [phone].    We comply with applicable federal civil rights laws and [place] laws. We do not discriminate on the basis of race, color, national origin, age, disability, sex, sexual orientation, or gender identity.            Thank you!     Thank you for choosing [facility] ENDOCRINOLOGY  for your care. Our goal is always to provide you with excellent care. Hearing back from our patients is one way we can continue to improve our services. Please take a few minutes to complete the written survey that you may receive in the mail after your visit with us. Thank you!             Your Updated Medication List - Protect others around you:  Learn how to safely use, store and throw away your medicines at www.disposemymeds.org.          This list is accurate as of: 12/4/17  3:33 PM.  Always use your most recent med list.                   Brand Name Dispense Instructions for use Diagnosis    amLODIPine 5 MG tablet    NORVASC    90 tablet    Take 1 tablet (5 mg) by mouth daily    Type 1 diabetes mellitus with complication (H)       BASAGLAR 100 UNIT/ML injection     30 mL    Inject 54 Units Subcutaneous daily    Type 1 diabetes mellitus with complication (H)       blood glucose monitoring meter device kit     1 kit    Use to test blood sugar 4 times daily or as directed.    Type 1 diabetes mellitus with hyperglycemia (H)       * blood glucose monitoring test strip    ACCU-CHEK SMARTVIEW    400 strip    Test 4 times daily    Type 1 diabetes mellitus (H)       * ACCU-CHEK FIOR test strip   Generic drug:  blood glucose monitoring     120 strip    Use to test blood sugar 4 times daily or as directed.    Type 1 diabetes mellitus (H)       * blood glucose monitoring test strip    TRINA CONTOUR NEXT    300 Month    For use with insulin pump, patient testing 10x/day    Type 1 diabetes mellitus without complication (H)       * blood glucose monitoring test strip    ONETOUCH VERIO IQ    400 each    Use to test blood sugar 4 times daily or as directed.    Type 1 diabetes mellitus with hyperglycemia (H)       cetirizine 10 MG tablet    zyrTEC     Take 10 mg by mouth daily        dextroamphetamine 10 MG tablet    DEXTROSTAT     Take 10 mg by mouth daily        furosemide 10 mg Tabs half-tab    LASIX     Take 10 mg by mouth daily        insulin lispro 100 UNIT/ML injection    humaLOG    60 mL    Inject premeals and for snacks and correction . Pt uses approx 60 units in 24 hrs.,    Type 1 diabetes mellitus (H)       insulin  UNIT/ML injection    HumuLIN N VIAL    60 mL    Inject 30 units SQ each am and 30 units SQ at bedtime.    Diabetes mellitus type 1 (H)     "   insulin syringe 31G X 5/16\" 0.5 ML Misc     100 each    Use twice daily    Type 1 diabetes mellitus with hyperglycemia (H)       losartan 100 MG tablet    COZAAR    90 tablet    Take 1 tablet (100 mg) by mouth daily    Type 1 diabetes mellitus with complication (H)       NALTREXONE HCL PO           ONETOUCH DELICA LANCETS 33G Misc     400 each    1 lancet 4 times daily    Type 1 diabetes mellitus with hyperglycemia (H)       priLOSEC OTC 20 MG tablet   Generic drug:  omeprazole      Take by mouth every evening        VYVANSE 10 MG capsule   Generic drug:  lisdexamfetamine      Take 10 mg by mouth every morning        * Notice:  This list has 4 medication(s) that are the same as other medications prescribed for you. Read the directions carefully, and ask your doctor or other care provider to review them with you.      "

## 2017-12-04 NOTE — LETTER
12/4/2017       RE: Pascual Swanson  [address] [place] [address]     Dear Colleague,    Thank you for referring your patient, Pascual Swanson, to the [facility] ENDOCRINOLOGY at [facility]. Please see a copy of my visit note below.    Endocrinology and Diabetes Clinic    Subjective:   Pascual Swanson is a 52 year old man here for follow up of type 1 diabetes mellitus. He saw Milton Fleming and Lauren Palomino in the past, and this is his first visit with me.     Type 1 diabetes was diagnosed at age 29.  He denies diabetes complications, but does have some mild tingling in his toes which is relatively new.     For financial reasons he has been using NPH and Humalog insulin.  This has gone reasonably well, but he now has insurance coverage and is interested in obtaining an insulin pump.  Current insulin doses are NPH 30 units twice daily and Humalog 14-15 units with meals.  His  hemoglobin A1c was 7.1% when he saw Lauren Palomino in June, and is 7.1% again today.  However, he has been experiencing frequent hypoglycemia in the last 3 weeks.  He gets typical symptoms with episodes.  Hypoglycemia seems to occur most often in the evening or nighttime hours.    He checks blood glucose 1-2 times per day.  He uses two glucose meters, one for home and one for work, but only remembered to bring the work meter today.  This showed an average blood glucose of 198, with most readings from before lunch or during the afternoon. He reports that his highest blood sugars are usually in the afternoon.     Medications:   Current Outpatient Prescriptions   Medication Sig Dispense Refill     cetirizine (ZYRTEC) 10 MG tablet Take 10 mg by mouth daily       furosemide (LASIX) 10 mg TABS half-tab Take 10 mg by mouth daily       lisdexamfetamine (VYVANSE) 10 MG capsule Take 10 mg by mouth every morning       dextroamphetamine (DEXTROSTAT) 10 MG tablet Take 10 mg by mouth daily       NALTREXONE HCL PO  "       BASAGLAR 100 UNIT/ML injection Inject 54 Units Subcutaneous daily 30 mL 3     amLODIPine (NORVASC) 5 MG tablet Take 1 tablet (5 mg) by mouth daily 90 tablet 3     insulin lispro (HUMALOG) 100 UNIT/ML injection Inject premeals and for snacks and correction . Pt uses approx 60 units in 24 hrs., 60 mL 3     insulin NPH (HUMULIN N VIAL) 100 UNIT/ML injection Inject 30 units SQ each am and 30 units SQ at bedtime. 60 mL 3     losartan (COZAAR) 100 MG tablet Take 1 tablet (100 mg) by mouth daily 90 tablet 3     insulin syringe 31G X 5/16\" 0.5 ML MISC Use twice daily 100 each 11     blood glucose monitoring (ONETOUCH VERIO) meter device kit Use to test blood sugar 4 times daily or as directed. 1 kit 0     blood glucose monitoring (ONE TOUCH VERIO IQ) test strip Use to test blood sugar 4 times daily or as directed. 400 each 3     ONETOUCH DELICA LANCETS 33G MISC 1 lancet 4 times daily 400 each 3     blood glucose (TRINA CONTOUR NEXT) test strip For use with insulin pump, patient testing 10x/day 300 Month 12     ACCU-CHEK FIOR test strip Use to test blood sugar 4 times daily or as directed. 120 strip 11     omeprazole (PRILOSEC OTC) 20 MG tablet Take by mouth every evening        glucose blood VI test strips (ACCU-CHEK SMARTVIEW) strip Test 4 times daily 400 strip 3     Social History:  He is  and has four daughters, two who are teenagers now, and 8-year-old twins.  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  He does not use tobacco products or drink alcohol.  Social History   Substance Use Topics     Smoking status: Former Smoker     Smokeless tobacco: Never Used      Comment: 20 YEARS AGO     Alcohol use No      Comment: History of Chemical Dependence     Review of Systems:   GENERAL: Negative  SKIN: Negative  HENT: Negative   EYE: He is being treated for an eye infection with antibiotic eyedrops.  HEART: Negative  RESPIRATORY: Negative   GI: Negative  : Negative  MSK: He has swelling in his ankles " "and has been using Lasix for this reason.  This is managed by his primary physician at [facility].  BLOOD/LYMPH: Negative  NEUROLOGIC: Mild tingling in his toes.  PSYCH: Negative    Physical Examination:  Blood pressure (!) 158/95, pulse (P) 93, height 6' 3.5\" (1.918 m), weight 261 lb (118.4 kg).  Body mass index is 32.19 kg/(m^2).    Wt Readings from Last 4 Encounters:   12/04/17 261 lb (118.4 kg)   06/16/17 267 lb 14.4 oz (121.5 kg)   06/05/15 254 lb 9.6 oz (115.5 kg)   05/11/15 250 lb 7.1 oz (113.6 kg)     Recheck BP: 151/101    General: Well appearing and in no distress.   Eyes: EOMI. Sclerae and conjunctivae are clear.    HENT: No thyromegaly or mass.    Lymphatic: No cervical or supraclavicular lymphadenopathy.  Cardiovascular: RRR, with normal S1+S2 and no murmurs.   Respiratory: Lungs are clear to auscultation.   Gastrointestinal: Abdomen is soft, non tender, and non-distended.   Extremities: Mild peripheral edema. Feet are warm and well perfused. No open lesions or ulcers.   Neurologic: No tremor with hands outstretched.  Absent ankle reflexes. Normal to mildly reduced sensation to monofilament in the feet bilaterally.     Labs and Studies:   Component      Latest Ref Rng & Units 12/4/2017   Hemoglobin A1C      4.3 - 6.0 % 7.1 (A)     Assessment:  1.  Type 1 diabetes mellitus, with good glycemic control but too much hypoglycemia.   A.  Possible peripheral neuropathy, which is mild.  2.  Hypertension, which is treated but not well controlled.  He is taking losartan 100 mg daily, as well as Lasix for lower extremity edema.  3.  Obstructive sleep apnea.  He has not been using CPAP, and is due for repeat sleep study.  4.  History of mild hypokalemia.  5.  Gastroesophageal reflux disease.    6.  Obesity.    Plan:   Discontinue NPH insulin and start glargine (Basaglar) insulin at a starting dose of 54 units per day.  Contact me as needed for dose titration.  Referral to CDE to start the process for obtaining an " insulin pump.  Start amlodipine 5 mg daily for hypertension.  I also recommended that he be seen again in the Sleep Clinic, as treatment for  obstructive sleep apnea would be expected to help with blood pressure, blood sugars, and weight.  He is not taking a statin for primary prevention of ASCVD.  We discussed this but elected to wait until our next visit to start another new medication. His last LDL was 50.     Follow up in clinic in 6 months, and contact me as needed in the interim.  I also offered follow-up with Lauren Palomino, which he decided to use on an as-needed basis.    Zoey Fleming MD   Diabetes and Endocrinology   [phone]

## 2017-12-10 NOTE — PROGRESS NOTES
Endocrinology and Diabetes Clinic    Subjective:   Pascual Swanson is a 52 year old man here for follow up of type 1 diabetes mellitus. He saw Milton Fleming and Lauren Palomino in the past, and this is his first visit with me.     Type 1 diabetes was diagnosed at age 29.  He denies diabetes complications, but does have some mild tingling in his toes which is relatively new.     For financial reasons he has been using NPH and Humalog insulin.  This has gone reasonably well, but he now has insurance coverage and is interested in obtaining an insulin pump.  Current insulin doses are NPH 30 units twice daily and Humalog 14-15 units with meals.  His  hemoglobin A1c was 7.1% when he saw Lauren Palomino in June, and is 7.1% again today.  However, he has been experiencing frequent hypoglycemia in the last 3 weeks.  He gets typical symptoms with episodes.  Hypoglycemia seems to occur most often in the evening or nighttime hours.    He checks blood glucose 1-2 times per day.  He uses two glucose meters, one for home and one for work, but only remembered to bring the work meter today.  This showed an average blood glucose of 198, with most readings from before lunch or during the afternoon. He reports that his highest blood sugars are usually in the afternoon.     Medications:   Current Outpatient Prescriptions   Medication Sig Dispense Refill     cetirizine (ZYRTEC) 10 MG tablet Take 10 mg by mouth daily       furosemide (LASIX) 10 mg TABS half-tab Take 10 mg by mouth daily       lisdexamfetamine (VYVANSE) 10 MG capsule Take 10 mg by mouth every morning       dextroamphetamine (DEXTROSTAT) 10 MG tablet Take 10 mg by mouth daily       NALTREXONE HCL PO        BASAGLAR 100 UNIT/ML injection Inject 54 Units Subcutaneous daily 30 mL 3     amLODIPine (NORVASC) 5 MG tablet Take 1 tablet (5 mg) by mouth daily 90 tablet 3     insulin lispro (HUMALOG) 100 UNIT/ML injection Inject premeals and for snacks and correction . Pt uses approx 60  "units in 24 hrs., 60 mL 3     insulin NPH (HUMULIN N VIAL) 100 UNIT/ML injection Inject 30 units SQ each am and 30 units SQ at bedtime. 60 mL 3     losartan (COZAAR) 100 MG tablet Take 1 tablet (100 mg) by mouth daily 90 tablet 3     insulin syringe 31G X 5/16\" 0.5 ML MISC Use twice daily 100 each 11     blood glucose monitoring (ONETOUCH VERIO) meter device kit Use to test blood sugar 4 times daily or as directed. 1 kit 0     blood glucose monitoring (ONE TOUCH VERIO IQ) test strip Use to test blood sugar 4 times daily or as directed. 400 each 3     ONETOUCH DELICA LANCETS 33G MISC 1 lancet 4 times daily 400 each 3     blood glucose (TRINA CONTOUR NEXT) test strip For use with insulin pump, patient testing 10x/day 300 Month 12     ACCU-CHEK FIOR test strip Use to test blood sugar 4 times daily or as directed. 120 strip 11     omeprazole (PRILOSEC OTC) 20 MG tablet Take by mouth every evening        glucose blood VI test strips (ACCU-CHEK SMARTVIEW) strip Test 4 times daily 400 strip 3     Social History:  He is  and has four daughters, two who are teenagers now, and 8-year-old twins.  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  He does not use tobacco products or drink alcohol.  Social History   Substance Use Topics     Smoking status: Former Smoker     Smokeless tobacco: Never Used      Comment: 20 YEARS AGO     Alcohol use No      Comment: History of Chemical Dependence     Review of Systems:   GENERAL: Negative  SKIN: Negative  HENT: Negative   EYE: He is being treated for an eye infection with antibiotic eyedrops.  HEART: Negative  RESPIRATORY: Negative   GI: Negative  : Negative  MSK: He has swelling in his ankles and has been using Lasix for this reason.  This is managed by his primary physician at [facility].  BLOOD/LYMPH: Negative  NEUROLOGIC: Mild tingling in his toes.  PSYCH: Negative    Physical Examination:  Blood pressure (!) 158/95, pulse (P) 93, height 6' 3.5\" (1.918 m), weight 261 lb " (118.4 kg).  Body mass index is 32.19 kg/(m^2).    Wt Readings from Last 4 Encounters:   12/04/17 261 lb (118.4 kg)   06/16/17 267 lb 14.4 oz (121.5 kg)   06/05/15 254 lb 9.6 oz (115.5 kg)   05/11/15 250 lb 7.1 oz (113.6 kg)     Recheck BP: 151/101    General: Well appearing and in no distress.   Eyes: EOMI. Sclerae and conjunctivae are clear.    HENT: No thyromegaly or mass.    Lymphatic: No cervical or supraclavicular lymphadenopathy.  Cardiovascular: RRR, with normal S1+S2 and no murmurs.   Respiratory: Lungs are clear to auscultation.   Gastrointestinal: Abdomen is soft, non tender, and non-distended.   Extremities: Mild peripheral edema. Feet are warm and well perfused. No open lesions or ulcers.   Neurologic: No tremor with hands outstretched.  Absent ankle reflexes. Normal to mildly reduced sensation to monofilament in the feet bilaterally.     Labs and Studies:   Component      Latest Ref Rng & Units 12/4/2017   Hemoglobin A1C      4.3 - 6.0 % 7.1 (A)     Assessment:  1.  Type 1 diabetes mellitus, with good glycemic control but too much hypoglycemia.   A.  Possible peripheral neuropathy, which is mild.  2.  Hypertension, which is treated but not well controlled.  He is taking losartan 100 mg daily, as well as Lasix for lower extremity edema.  3.  Obstructive sleep apnea.  He has not been using CPAP, and is due for repeat sleep study.  4.  History of mild hypokalemia.  5.  Gastroesophageal reflux disease.    6.  Obesity.    Plan:   Discontinue NPH insulin and start glargine (Basaglar) insulin at a starting dose of 54 units per day.  Contact me as needed for dose titration.  Referral to CDE to start the process for obtaining an insulin pump.  Start amlodipine 5 mg daily for hypertension.  I also recommended that he be seen again in the Sleep Clinic, as treatment for  obstructive sleep apnea would be expected to help with blood pressure, blood sugars, and weight.  He is not taking a statin for primary  prevention of ASCVD.  We discussed this but elected to wait until our next visit to start another new medication. His last LDL was 50.     Follow up in clinic in 6 months, and contact me as needed in the interim.  I also offered follow-up with Lauren Palomino, which he decided to use on an as-needed basis.    Zoey Fleming MD   Diabetes and Endocrinology   [phone]

## 2018-03-23 RX ORDER — FUROSEMIDE 40 MG
20 TABLET ORAL DAILY
Qty: 45 TABLET | OUTPATIENT
Start: 2018-03-23

## 2018-06-29 DIAGNOSIS — E10.8 TYPE 1 DIABETES MELLITUS WITH COMPLICATION (H): ICD-10-CM

## 2018-06-29 RX ORDER — LOSARTAN POTASSIUM 100 MG/1
100 TABLET ORAL DAILY
Qty: 30 TABLET | Refills: 0 | Status: SHIPPED | OUTPATIENT
Start: 2018-06-29 | End: 2018-08-03

## 2018-06-29 NOTE — TELEPHONE ENCOUNTER
Health Call Center    Phone Message    May a detailed message be left on voicemail: no    Reason for Call: Medication Refill Request    Has the patient contacted the pharmacy for the refill? Yes   Name of medication being requested: losartan (COZAAR) 100 MG tablet  Provider who prescribed the medication: Candelario  Pharmacy: [facility]/PH[facility]Y #[id number] [id number] [facility]      Date medication is needed: ASA - [facility] has been sending requests but did not hear back, so requested patient contact us.          Action Taken: Message routed to:  Other: [facility] MED refill team

## 2018-06-30 NOTE — TELEPHONE ENCOUNTER
Last Clinic Visit: 12/4/2017  [facility] Endocrinology  90 day brayden refill sent  Pt has not been seen since a new antihypertensive medication was added and is over due for labs.    Kathleen M Doege RN

## 2018-07-11 DIAGNOSIS — E10.8 TYPE 1 DIABETES MELLITUS WITH COMPLICATION (H): ICD-10-CM

## 2018-07-12 RX ORDER — INSULIN GLARGINE 100 [IU]/ML
54 INJECTION, SOLUTION SUBCUTANEOUS DAILY
Qty: 60 ML | Refills: 3 | Status: SHIPPED | OUTPATIENT
Start: 2018-07-12 | End: 2018-11-08

## 2018-08-03 DIAGNOSIS — E10.8 TYPE 1 DIABETES MELLITUS WITH COMPLICATION (H): ICD-10-CM

## 2018-08-03 RX ORDER — LOSARTAN POTASSIUM 100 MG/1
100 TABLET ORAL DAILY
Qty: 30 TABLET | Refills: 0 | Status: SHIPPED | OUTPATIENT
Start: 2018-08-03 | End: 2018-10-09

## 2018-08-06 RX ORDER — LOSARTAN POTASSIUM 100 MG/1
100 TABLET ORAL DAILY
Qty: 30 TABLET | Refills: 11 | Status: SHIPPED | OUTPATIENT
Start: 2018-08-06

## 2018-09-17 ENCOUNTER — TRANSFERRED RECORDS (OUTPATIENT)
Dept: HEALTH INFORMATION MANAGEMENT | Facility: CLINIC | Age: 53
End: 2018-09-17

## 2018-10-08 ENCOUNTER — TELEPHONE (OUTPATIENT)
Dept: ENDOCRINOLOGY | Facility: CLINIC | Age: 53
End: 2018-10-08

## 2018-10-08 DIAGNOSIS — E10.8 TYPE 1 DIABETES MELLITUS WITH COMPLICATION (H): Primary | ICD-10-CM

## 2018-10-08 RX ORDER — FLASH GLUCOSE SENSOR
KIT MISCELLANEOUS
Qty: 3 EACH | Refills: 11 | Status: SHIPPED | OUTPATIENT
Start: 2018-10-08

## 2018-10-08 RX ORDER — FLASH GLUCOSE SENSOR
1 KIT MISCELLANEOUS ONCE
Qty: 1 DEVICE | Refills: 0 | Status: SHIPPED | OUTPATIENT
Start: 2018-10-08 | End: 2018-10-08

## 2018-10-08 NOTE — TELEPHONE ENCOUNTER
[facility] Call Center    Phone Message    May a detailed message be left on voicemail: yes    Reason for Call: pt calling to get the Concepcion glucometer. He saw it on TV and he wants to see if he can get it. please call pt to discuss further.    Action Taken: Message routed to:  [facility] ([facility] endocrine

## 2018-10-08 NOTE — TELEPHONE ENCOUNTER
Would like the Concepcion  Ordered. On insulin , Checks BS 4 times a day but has not been seen since 12/2017 with [facility] insurance. I don't know if insurance will approve. .

## 2018-11-01 DIAGNOSIS — E10.69 TYPE 1 DIABETES MELLITUS WITH OTHER SPECIFIED COMPLICATION (H): ICD-10-CM

## 2018-11-06 ENCOUNTER — PATIENT OUTREACH (OUTPATIENT)
Dept: CARE COORDINATION | Facility: CLINIC | Age: 53
End: 2018-11-06

## 2018-11-08 ENCOUNTER — OFFICE VISIT (OUTPATIENT)
Dept: ENDOCRINOLOGY | Facility: CLINIC | Age: 53
End: 2018-11-08
Payer: COMMERCIAL

## 2018-11-08 VITALS
HEART RATE: 83 BPM | HEIGHT: 76 IN | SYSTOLIC BLOOD PRESSURE: 164 MMHG | BODY MASS INDEX: 33.56 KG/M2 | WEIGHT: 275.6 LBS | DIASTOLIC BLOOD PRESSURE: 101 MMHG

## 2018-11-08 DIAGNOSIS — E10.40 TYPE 1 DIABETES MELLITUS WITH DIABETIC NEUROPATHY (H): Primary | ICD-10-CM

## 2018-11-08 LAB — HBA1C MFR BLD: 7.1 % (ref 4.3–6)

## 2018-11-08 RX ORDER — INSULIN GLARGINE 100 [IU]/ML
INJECTION, SOLUTION SUBCUTANEOUS
Qty: 60 ML | Refills: 3 | COMMUNITY
Start: 2018-11-08

## 2018-11-08 NOTE — MR AVS SNAPSHOT
After Visit Summary   11/8/2018    Pascual Swanson    MRN: [id number]           Patient Information     Date Of Birth          1965        Visit Information        Provider Department      11/8/2018 12:00 PM Lauren Palomino PA-C [facility] Endocrinology        Today's Diagnoses     Type 1 diabetes mellitus (H)    -  1    Type 1 diabetes mellitus with complication (H)          Care Instructions    1.  Check blood pressure at home daily and send me or Dr. Zoey Fleming your BP readings via [employer].  2.  Continue current Basaglar doses and Humalog doses.  3.  See our diabetes educator to discuss use of an insulin pump.  4. See Orthopedic physician for trigger finger.  5.  See Dr. Zoey Fleming as scheduled in Dec 2018.  Lauren Palomino PA-C          Follow-ups after your visit        Your next 10 appointments already scheduled     Nov 26, 2018  3:30 PM CST   (Arrive by 3:15 PM)   Diabetes Education with Soraya Corbin RN   [facility] Diabetes ([facility])    [address] [place] [id number] [phone]            Dec 31, 2018  4:00 PM CST   (Arrive by 3:45 PM)   RETURN DIABETES with Zoey Fleming MD   [facility] Endocrinology ([facility])    [address] [place] [id number] [phone]              Who to contact     Please call your clinic at [phone] to:    Ask questions about your health    Make or cancel appointments    Discuss your medicines    Learn about your test results    Speak to your doctor            Additional Information About Your Visit        MyChart Information     [employer] is an electronic gateway that provides easy, online access to your medical records. With [employer], you can request a clinic appointment, read your test results, renew a prescription or communicate with your care team.     To sign up for deskwolft visit the website at [email or web address]   You  "will be asked to enter the access code listed below, as well as some personal information. Please follow the directions to create your username and password.     Your access code is: [email or web address]  Expires: 2019  6:31 AM     Your access code will  in 90 days. If you need help or a new code, please contact your [facility] or call [phone] for assistance.        Care EveryWhere ID     This is your Care EveryWhere ID. This could be used by other organizations to access your [facility] medical records  [id number]        Your Vitals Were     Pulse Height BMI (Body Mass Index)             83 1.918 m (6' 3.5\") 33.99 kg/m2          Blood Pressure from Last 3 Encounters:   18 (!) 164/101   17 (!) 158/95   17 132/80    Weight from Last 3 Encounters:   18 125 kg (275 lb 9.6 oz)   17 118.4 kg (261 lb)   17 121.5 kg (267 lb 14.4 oz)              We Performed the Following     Hemoglobin A1c POCT          Today's Medication Changes          These changes are accurate as of 18  1:10 PM.  If you have any questions, ask your nurse or doctor.               These medicines have changed or have updated prescriptions.        Dose/Directions    BASAGLAR 100 UNIT/ML injection   This may have changed:    - how much to take  - how to take this  - when to take this  - additional instructions   Used for:  Type 1 diabetes mellitus with complication (H)   Changed by:  Lauren Palomino PA-C        Inject 54 units subcutaneous each am and 10 units subcutaneous at bedtime.   Quantity:  60 mL   Refills:  3         Stop taking these medicines if you haven't already. Please contact your care team if you have questions.     amLODIPine 5 MG tablet   Commonly known as:  NORVASC   Stopped by:  Lauren Palomino PA-C                    Primary Care Provider    None Specified       No primary provider on file.        Equal Access to Services     DEYVI AVENDAÑO AH: " Hadii aad ku hadmarleeno Soomaali, waaxda luqadaha, qaybta kaalmada renaldo, nasir garvey. So [facility] [phone].    ATENCIÓN: Si habla xiomara, tiene a [place] disposición servicios gratuitos de asistencia lingüística. Llame al [phone].    We comply with applicable federal civil rights laws and [place] laws. We do not discriminate on the basis of race, color, national origin, age, disability, sex, sexual orientation, or gender identity.            Thank you!     Thank you for choosing [facility] ENDOCRINOLOGY  for your care. Our goal is always to provide you with excellent care. Hearing back from our patients is one way we can continue to improve our services. Please take a few minutes to complete the written survey that you may receive in the mail after your visit with us. Thank you!             Your Updated Medication List - Protect others around you: Learn how to safely use, store and throw away your medicines at www.disposemymeds.org.          This list is accurate as of 11/8/18  1:10 PM.  Always use your most recent med list.                   Brand Name Dispense Instructions for use Diagnosis    BASAGLAR 100 UNIT/ML injection     60 mL    Inject 54 units subcutaneous each am and 10 units subcutaneous at bedtime.    Type 1 diabetes mellitus with complication (H)       blood glucose monitoring test strip    TRINA CONTOUR NEXT    400 strip    Test for times daily as directed    Type 1 diabetes mellitus without complication (H)       cetirizine 10 MG tablet    zyrTEC     Take 10 mg by mouth daily        continuous blood glucose monitoring sensor     3 each    For use with Freestyle Concepcion Flash  for continuous monitioring of blood glucose levels. Replace sensor every 10 days.    Type 1 diabetes mellitus with complication (H)       dextroamphetamine 10 MG tablet    DEXTROSTAT     Take 10 mg by mouth daily        furosemide 10 mg Tabs half-tab    LASIX     Take 10 mg by mouth daily  "       insulin lispro 100 UNIT/ML injection    humaLOG    60 mL    Inject premeals and for snacks and correction . Pt uses approx 60 units in 24 hrs.,    Type 1 diabetes mellitus with other specified complication (H)       insulin syringe 31G X 5/16\" 0.5 ML Misc     100 each    Use twice daily    Type 1 diabetes mellitus with hyperglycemia (H)       losartan 100 MG tablet    COZAAR    30 tablet    Take 1 tablet (100 mg) by mouth daily    Type 1 diabetes mellitus with complication (H)       ONETOUCH DELICA LANCETS 33G Misc     400 each    1 lancet 4 times daily    Type 1 diabetes mellitus with hyperglycemia (H)       priLOSEC OTC 20 MG tablet   Generic drug:  omeprazole      Take by mouth every evening        VYVANSE 10 MG capsule   Generic drug:  lisdexamfetamine      Take 10 mg by mouth every morning          "

## 2018-11-08 NOTE — LETTER
11/8/2018       RE: Pascual Swanson  [address] [place] [address]     Dear Colleague,    Thank you for referring your patient, Pascual Swanson, to the [facility] ENDOCRINOLOGY at [facility]. Please see a copy of my visit note below.    HPI  Pascual Swanson is a 53 year old male with type 1 diabetes mellitus here today for a follow up visit.  He has not been seen in our clinic since Dec 2017 by Dr. Zoey Fleming.  He tells me he has had financial issues.  Pt states he was dx having type 1 diabetes mellitus at age 29.  His diabetes is complicated by mild peripheral neuropathy.  He denies hx of retinopathy or nephropathy.   For his diabetes, he is currently taking Basaglar 54 units in the am and he added 10 units at bedtime due to high FBS values which has been helpful.  He is also taking Humalog 14-15 units with meals.  He has no interest in using an I/C ratio.  Pt's A1C is 7.1 % today.  His A1C was 7.1 % in Dec 2017.  Pascual is using a Freestyle Concepcion device/sensor and he has had some high blood sugars during the day. He admits to missing some meal time insulin injections.  No frequent hypoglycemia.  His average glucose was 163 over the past 14 days.  On ROS today, pt has hx of sleep apnea and is using his CPAP.  He reports mild numbness in his feet.  No ETOH intake since January 2018.  He has mild LE edema b/l.  Pt has a trigger finger which has been getting worse.   Pt denies blurred vision, n/v, SOB at rest, chest pain, abd pain, diarrhea, dysuria or hematuria.  No numbness, tingling or pain in feet or hands.  No fevers.    Diabetes Care  Retinopathy:none; pt seen by Oph in 9/2018.  Nephropathy:none; urine microalbuminuria was negative in 6/2017. He is taking Cozaar.  Neuropathy:mild in both feet.  Foot Exam:no ulcers;mild edema. Normal monofilamentous exam.  Taking aspirin:no.  Lipids:LDL was 50 on 5/242017 at [facility].    ROS  Please see under  "HPI.    Allergies  Allergies   Allergen Reactions     Amlodipine Diarrhea and Cramps     Bactrim [Sulfamethoxazole W/Trimethoprim] Hives     Hives in eyes     Dust Mites      Seasonal Allergies        Medications  Current Outpatient Prescriptions   Medication Sig Dispense Refill     BASAGLAR 100 UNIT/ML injection Inject 54 units subcutaneous each am and 10 units subcutaneous at bedtime. 60 mL 3     blood glucose monitoring (TRINA CONTOUR NEXT) test strip Test for times daily as directed 400 strip 3     cetirizine (ZYRTEC) 10 MG tablet Take 10 mg by mouth daily       continuous blood glucose monitoring (FREESTYLE DAVID) sensor For use with Freestyle David Flash  for continuous monitioring of blood glucose levels. Replace sensor every 10 days. 3 each 11     dextroamphetamine (DEXTROSTAT) 10 MG tablet Take 10 mg by mouth daily       furosemide (LASIX) 10 mg TABS half-tab Take 10 mg by mouth daily       insulin lispro (HUMALOG) 100 UNIT/ML injection Inject premeals and for snacks and correction . Pt uses approx 60 units in 24 hrs., 60 mL 1     insulin syringe 31G X 5/16\" 0.5 ML MISC Use twice daily 100 each 11     lisdexamfetamine (VYVANSE) 10 MG capsule Take 10 mg by mouth every morning       losartan (COZAAR) 100 MG tablet Take 1 tablet (100 mg) by mouth daily 30 tablet 11     omeprazole (PRILOSEC OTC) 20 MG tablet Take by mouth every evening        ONETOUCH DELICA LANCETS 33G MISC 1 lancet 4 times daily 400 each 3       Family History  family history is negative for Anesthesia Reaction.    Social History  Smoke: none.  He has 4 daughters.    Past Medical History  Past Medical History:   Diagnosis Date     Alcohol abuse, in remission      GERD (gastroesophageal reflux disease)      Hypertension      Sleep apnea      Type 1 diabetes mellitus (H)      Venous (peripheral) insufficiency      Past Surgical History:   Procedure Laterality Date     APPENDECTOMY  Age 13     EXCISE GANGLION FOOT Right 4/6/2015    " "Procedure: EXCISE GANGLION FOOT;  Surgeon: Neto Crabtree MD;  Location: [facility] OR     EXTRACTION(S) DENTAL       HERNIA REPAIR  Age 3     IRRIGATION AND DEBRIDEMENT FOOT, COMBINED Right 5/11/2015    Procedure: COMBINED IRRIGATION AND DEBRIDEMENT FOOT;  Surgeon: Neto Crabtree MD;  Location: [facility] OR       Physical Exam  BP (!) 164/101  Pulse 83  Ht 1.918 m (6' 3.5\")  Wt 125 kg (275 lb 9.6 oz)  BMI 33.99 kg/m2  Body mass index is 33.99 kg/(m^2).  Hypertensive- he has not taken his meds this am.    GENERAL : In no apparent distress  EXTREMITIES: mild pretibial edema b/l. No ulcers.      RESULTS  Creatinine   Date Value Ref Range Status   06/16/2017 0.80 0.66 - 1.25 mg/dL Final     GFR Estimate   Date Value Ref Range Status   06/16/2017 >90  Non  GFR Calc   >60 mL/min/1.7m2 Final     Hemoglobin A1C   Date Value Ref Range Status   03/23/2015 9.1 (H) 4.1 - 5.7 % Final     Potassium   Date Value Ref Range Status   06/16/2017 3.4 3.4 - 5.3 mmol/L Final     TSH   Date Value Ref Range Status   06/16/2017 2.74 0.40 - 4.00 mU/L Final     T4 Free   Date Value Ref Range Status   06/16/2017 0.95 0.76 - 1.46 ng/dL Final       Cholesterol   Date Value Ref Range Status   11/21/2013 140 0 - 200 mg/dL Final     Comment:     LDL Cholesterol is the primary guide to therapy.   The NCEP recommends further evaluation of: patients with cholesterol greater   than 200 mg/dL if additional risk factors are present, cholesterol greater   than   240 mg/dL, triglycerides greater than 150 mg/dL, or HDL less than 40 mg/dL.   06/30/2011 150 0 - 200 mg/dL Final     Comment:     LDL Cholesterol is the primary guide to therapy.   The NCEP recommends further evaluation of: patients with cholesterol greater   than 200 mg/dL if additional risk factors are present, cholesterol greater   than   240 mg/dL, triglycerides greater than 150 mg/dL, or HDL less than 40 mg/dL.     HDL Cholesterol   Date Value Ref Range Status "   11/21/2013 53 40 - 110 mg/dL Final   06/30/2011 71 40 - 110 mg/dL Final     LDL Cholesterol Calculated   Date Value Ref Range Status   11/21/2013 66 0 - 129 mg/dL Final     Comment:     LDL Cholesterol is the primary guide to therapy: LDL-cholesterol goal in high   risk patients is <100 mg/dL and in very high risk patients is <70 mg/dL.   06/30/2011 67 0 - 129 mg/dL Final     Comment:     LDL Cholesterol is the primary guide to therapy: LDL-cholesterol goal in high   risk patients is <100 mg/dL and in very high risk patients is <70 mg/dL.     LDL Cholesterol Direct   Date Value Ref Range Status   04/14/2015 53 0 - 129 mg/dL Final     Comment:     Optimal:         <100 mg/dL   Near Optimal:     100-129 mg/dL   Borderline High:  130-159 mg/dL   High:             160-189 mg/dL   Very high:  greater than or equal to 190 mg/dL   Cannot estimate LDL when triglyceride exceeds 400 mg/dL       Triglycerides   Date Value Ref Range Status   11/21/2013 107 0 - 150 mg/dL Final     Comment:     Fasting specimen   06/30/2011 61 0 - 150 mg/dL Final     Comment:     Fasting specimen     Cholesterol/HDL Ratio   Date Value Ref Range Status   11/21/2013 2.7 0.0 - 5.0 Final   06/30/2011 2.1 0.0 - 5.0 Final     A1C   7.1 % today.    ASSESSMENT/PLAN:    1. TYPE 1 DIABETES MELLITUS: Type 1 diabetes mellitus complicated by mild peripheral neuropathy in both feet.  His A1C is good at 7.1 % today without frequent hypoglycemia.  No change in insulin doses today.  He expressed some interest in using an insulin pump today and would like to meet with our CDE.  Pt was seen by Oph in 9/2018 without any evidence of retinopathy.  His urine microalbuminuria was negative in 6/2017. He is taking Cozaar.  His LDL was 50 on 5/24/2017 at [facility].  He is not taking a statin.  Flu vaccine given today.    2. HTN:  BP too high. He did not take his meds yet.  He tells me he is taking Cozaar 100 mg daily and Lasix 10-40 mg daily.  He was prescribed to also take  Amlodipine which he stopped taking stating it made his stomach hurt.  He is to take his BP meds when he gets home and check his BP daily and send me or Dr. Zoey Fleming is BP readings early next week for review.    3. NEUROPATHY: Mild peripheral neuropathy in both feet.  No need for RX.  Diabetic foot care reviewed.    4.TRIGGER FINGER: Referred to Ortho for evaluation.    5.  OBESITY: Encouraged pt to make healthy food choices and increase his activity.    6.  Return to Endocrine Clinic to see Dr. Zoey Fleming in Dec 2018.      Lauren Palomino PA-C

## 2018-11-08 NOTE — NURSING NOTE
Chief Complaint   Patient presents with     RECHECK     DM1     Capillary puncture performed for Hemoglobin A1C test. Patient tolerated well.    Sana Payne MA

## 2018-11-08 NOTE — PATIENT INSTRUCTIONS
1.  Check blood pressure at home daily and send me or Dr. Zoey Fleming your BP readings via [employer].  2.  Continue current Basaglar doses and Humalog doses.  3.  See our diabetes educator to discuss use of an insulin pump.  4. See Orthopedic physician for trigger finger.  5.  See Dr. Zoey Fleming as scheduled in Dec 2018.  Lauren Palomino PA-C

## 2018-11-12 NOTE — PROGRESS NOTES
HPI  Pascual Swanson is a 53 year old male with type 1 diabetes mellitus here today for a follow up visit.  He has not been seen in our clinic since Dec 2017 by Dr. Zoey Fleming.  He tells me he has had financial issues.  Pt states he was dx having type 1 diabetes mellitus at age 29.  His diabetes is complicated by mild peripheral neuropathy.  He denies hx of retinopathy or nephropathy.   For his diabetes, he is currently taking Basaglar 54 units in the am and he added 10 units at bedtime due to high FBS values which has been helpful.  He is also taking Humalog 14-15 units with meals.  He has no interest in using an I/C ratio.  Pt's A1C is 7.1 % today.  His A1C was 7.1 % in Dec 2017.  Pascual is using a Freestyle Concepcion device/sensor and he has had some high blood sugars during the day. He admits to missing some meal time insulin injections.  No frequent hypoglycemia.  His average glucose was 163 over the past 14 days.  On ROS today, pt has hx of sleep apnea and is using his CPAP.  He reports mild numbness in his feet.  No ETOH intake since January 2018.  He has mild LE edema b/l.  Pt has a trigger finger which has been getting worse.   Pt denies blurred vision, n/v, SOB at rest, chest pain, abd pain, diarrhea, dysuria or hematuria.  No numbness, tingling or pain in feet or hands.  No fevers.    Diabetes Care  Retinopathy:none; pt seen by Oph in 9/2018.  Nephropathy:none; urine microalbuminuria was negative in 6/2017. He is taking Cozaar.  Neuropathy:mild in both feet.  Foot Exam:no ulcers;mild edema. Normal monofilamentous exam.  Taking aspirin:no.  Lipids:LDL was 50 on 5/242017 at [facility].    ROS  Please see under HPI.    Allergies  Allergies   Allergen Reactions     Amlodipine Diarrhea and Cramps     Bactrim [Sulfamethoxazole W/Trimethoprim] Hives     Hives in eyes     Dust Mites      Seasonal Allergies        Medications  Current Outpatient Prescriptions   Medication Sig Dispense Refill     BASAGLAR 100 UNIT/ML  "injection Inject 54 units subcutaneous each am and 10 units subcutaneous at bedtime. 60 mL 3     blood glucose monitoring (TRINA CONTOUR NEXT) test strip Test for times daily as directed 400 strip 3     cetirizine (ZYRTEC) 10 MG tablet Take 10 mg by mouth daily       continuous blood glucose monitoring (FREESTYLE DAVID) sensor For use with Freestyle David Flash  for continuous monitioring of blood glucose levels. Replace sensor every 10 days. 3 each 11     dextroamphetamine (DEXTROSTAT) 10 MG tablet Take 10 mg by mouth daily       furosemide (LASIX) 10 mg TABS half-tab Take 10 mg by mouth daily       insulin lispro (HUMALOG) 100 UNIT/ML injection Inject premeals and for snacks and correction . Pt uses approx 60 units in 24 hrs., 60 mL 1     insulin syringe 31G X 5/16\" 0.5 ML MISC Use twice daily 100 each 11     lisdexamfetamine (VYVANSE) 10 MG capsule Take 10 mg by mouth every morning       losartan (COZAAR) 100 MG tablet Take 1 tablet (100 mg) by mouth daily 30 tablet 11     omeprazole (PRILOSEC OTC) 20 MG tablet Take by mouth every evening        ONETOUCH DELICA LANCETS 33G MISC 1 lancet 4 times daily 400 each 3       Family History  family history is negative for Anesthesia Reaction.    Social History  Smoke: none.  He has 4 daughters.    Past Medical History  Past Medical History:   Diagnosis Date     Alcohol abuse, in remission      GERD (gastroesophageal reflux disease)      Hypertension      Sleep apnea      Type 1 diabetes mellitus (H)      Venous (peripheral) insufficiency      Past Surgical History:   Procedure Laterality Date     APPENDECTOMY  Age 13     EXCISE GANGLION FOOT Right 4/6/2015    Procedure: EXCISE GANGLION FOOT;  Surgeon: Neto Crabtree MD;  [facility]     EXTRACTION(S) DENTAL       HERNIA REPAIR  Age 3     IRRIGATION AND DEBRIDEMENT FOOT, COMBINED Right 5/11/2015    Procedure: COMBINED IRRIGATION AND DEBRIDEMENT FOOT;  Surgeon: Neto Crabtree MD;  Location: " "[facility] OR       Physical Exam  BP (!) 164/101  Pulse 83  Ht 1.918 m (6' 3.5\")  Wt 125 kg (275 lb 9.6 oz)  BMI 33.99 kg/m2  Body mass index is 33.99 kg/(m^2).  Hypertensive- he has not taken his meds this am.    GENERAL : In no apparent distress  EXTREMITIES: mild pretibial edema b/l. No ulcers.      RESULTS  Creatinine   Date Value Ref Range Status   06/16/2017 0.80 0.66 - 1.25 mg/dL Final     GFR Estimate   Date Value Ref Range Status   06/16/2017 >90  Non  GFR Calc   >60 mL/min/1.7m2 Final     Hemoglobin A1C   Date Value Ref Range Status   03/23/2015 9.1 (H) 4.1 - 5.7 % Final     Potassium   Date Value Ref Range Status   06/16/2017 3.4 3.4 - 5.3 mmol/L Final     TSH   Date Value Ref Range Status   06/16/2017 2.74 0.40 - 4.00 mU/L Final     T4 Free   Date Value Ref Range Status   06/16/2017 0.95 0.76 - 1.46 ng/dL Final       Cholesterol   Date Value Ref Range Status   11/21/2013 140 0 - 200 mg/dL Final     Comment:     LDL Cholesterol is the primary guide to therapy.   The NCEP recommends further evaluation of: patients with cholesterol greater   than 200 mg/dL if additional risk factors are present, cholesterol greater   than   240 mg/dL, triglycerides greater than 150 mg/dL, or HDL less than 40 mg/dL.   06/30/2011 150 0 - 200 mg/dL Final     Comment:     LDL Cholesterol is the primary guide to therapy.   The NCEP recommends further evaluation of: patients with cholesterol greater   than 200 mg/dL if additional risk factors are present, cholesterol greater   than   240 mg/dL, triglycerides greater than 150 mg/dL, or HDL less than 40 mg/dL.     HDL Cholesterol   Date Value Ref Range Status   11/21/2013 53 40 - 110 mg/dL Final   06/30/2011 71 40 - 110 mg/dL Final     LDL Cholesterol Calculated   Date Value Ref Range Status   11/21/2013 66 0 - 129 mg/dL Final     Comment:     LDL Cholesterol is the primary guide to therapy: LDL-cholesterol goal in high   risk patients is <100 mg/dL and in very high " risk patients is <70 mg/dL.   06/30/2011 67 0 - 129 mg/dL Final     Comment:     LDL Cholesterol is the primary guide to therapy: LDL-cholesterol goal in high   risk patients is <100 mg/dL and in very high risk patients is <70 mg/dL.     LDL Cholesterol Direct   Date Value Ref Range Status   04/14/2015 53 0 - 129 mg/dL Final     Comment:     Optimal:         <100 mg/dL   Near Optimal:     100-129 mg/dL   Borderline High:  130-159 mg/dL   High:             160-189 mg/dL   Very high:  greater than or equal to 190 mg/dL   Cannot estimate LDL when triglyceride exceeds 400 mg/dL       Triglycerides   Date Value Ref Range Status   11/21/2013 107 0 - 150 mg/dL Final     Comment:     Fasting specimen   06/30/2011 61 0 - 150 mg/dL Final     Comment:     Fasting specimen     Cholesterol/HDL Ratio   Date Value Ref Range Status   11/21/2013 2.7 0.0 - 5.0 Final   06/30/2011 2.1 0.0 - 5.0 Final     A1C   7.1 % today.    ASSESSMENT/PLAN:    1. TYPE 1 DIABETES MELLITUS: Type 1 diabetes mellitus complicated by mild peripheral neuropathy in both feet.  His A1C is good at 7.1 % today without frequent hypoglycemia.  No change in insulin doses today.  He expressed some interest in using an insulin pump today and would like to meet with our CDE.  Pt was seen by Oph in 9/2018 without any evidence of retinopathy.  His urine microalbuminuria was negative in 6/2017. He is taking Cozaar.  His LDL was 50 on 5/24/2017 at [facility].  He is not taking a statin.  Flu vaccine given today.    2. HTN:  BP too high. He did not take his meds yet.  He tells me he is taking Cozaar 100 mg daily and Lasix 10-40 mg daily.  He was prescribed to also take Amlodipine which he stopped taking stating it made his stomach hurt.  He is to take his BP meds when he gets home and check his BP daily and send me or Dr. Zoey Fleming is BP readings early next week for review.    3. NEUROPATHY: Mild peripheral neuropathy in both feet.  No need for RX.  Diabetic foot care  reviewed.    4.TRIGGER FINGER: Referred to Ortho for evaluation.    5.  OBESITY: Encouraged pt to make healthy food choices and increase his activity.    6.  Return to Endocrine Clinic to see Dr. Zoey Fleming in Dec 2018.

## 2018-11-20 ENCOUNTER — TELEPHONE (OUTPATIENT)
Dept: ENDOCRINOLOGY | Facility: CLINIC | Age: 53
End: 2018-11-20

## 2018-11-20 NOTE — TELEPHONE ENCOUNTER
----- Message from Kortney Servin, RN sent at 11/20/2018  9:04 AM CST -----  Regarding: BP checks -   Thank you.   ----- Message -----     From: Lauren Palomino PA-C     Sent: 11/20/2018   8:27 AM       To: [facility] T[facility]    Please call pt and see if he has been checking his BP at home and if he has any BP readings for me to review.  Thanks   Liss

## 2018-11-23 RX ORDER — FLASH GLUCOSE SENSOR
KIT MISCELLANEOUS
Qty: 1 DEVICE | Status: CANCELLED | OUTPATIENT
Start: 2018-11-23

## 2018-11-26 ENCOUNTER — ALLIED HEALTH/NURSE VISIT (OUTPATIENT)
Dept: EDUCATION SERVICES | Facility: CLINIC | Age: 53
End: 2018-11-26
Payer: COMMERCIAL

## 2018-11-26 ENCOUNTER — TELEPHONE (OUTPATIENT)
Dept: ENDOCRINOLOGY | Facility: CLINIC | Age: 53
End: 2018-11-26

## 2018-11-26 DIAGNOSIS — E10.65 TYPE 1 DIABETES MELLITUS WITH HYPERGLYCEMIA (H): Primary | ICD-10-CM

## 2018-11-26 RX ORDER — FLASH GLUCOSE SENSOR
1 KIT MISCELLANEOUS
Qty: 2 EACH | Refills: 11 | Status: SHIPPED | OUTPATIENT
Start: 2018-11-26

## 2018-11-26 RX ORDER — FLASH GLUCOSE SCANNING READER
1 EACH MISCELLANEOUS
Qty: 1 DEVICE | Refills: 1 | Status: SHIPPED | OUTPATIENT
Start: 2018-11-26

## 2018-11-26 NOTE — PATIENT INSTRUCTIONS
1.  Expect to hear from [facility] in the next two weeks.  If you don't hear from them, let me know.    2.  If you decide to go ahead with ordering the pump let me know when it ships.    3. Open the pump and charge it up.  Do the learning modules that are on-line.    Soraya Corbin RN,CDE  [place] [id number]  Phone: [phone] or [phone]  [email or web address]

## 2018-11-26 NOTE — TELEPHONE ENCOUNTER
Pascual  will see Soraya Corbin today and can discuss the Concepcion  [place] and sensor. The 10 day device is no longer  avaialble changed to 14 day.

## 2018-11-26 NOTE — MR AVS SNAPSHOT
After Visit Summary   11/26/2018    Pascual Swanson    MRN: [id number]           Patient Information     Date Of Birth          1965        Visit Information        Provider Department      11/26/2018 3:30 PM Soraya Corbin RN [facility] Diabetes        Today's Diagnoses     Type 1 diabetes mellitus with hyperglycemia (H)    -  1      Care Instructions    1.  Expect to hear from [facility] in the next two weeks.  If you don't hear from them, let me know.    2.  If you decide to go ahead with ordering the pump let me know when it ships.    3. Open the pump and charge it up.  Do the learning modules that are on-line.    Soraya Corbin RN,CDE  [address] [address]  Phone: [phone] or [phone]  [email or web address]              Follow-ups after your visit        Your next 10 appointments already scheduled     Dec 31, 2018  4:00 PM CST   (Arrive by 3:45 PM)   RETURN DIABETES with Zoey Fleming MD   [facility] Endocrinology ([facility])    [address] [place] [id number] [phone]              Who to contact     Please call your clinic at [phone] to:    Ask questions about your health    Make or cancel appointments    Discuss your medicines    Learn about your test results    Speak to your doctor            Additional Information About Your Visit        MyChart Information     Veebeamt is an electronic gateway that provides easy, online access to your medical records. With HelloTel, you can request a clinic appointment, read your test results, renew a prescription or communicate with your care team.     To sign up for Veebeamt visit the website at [email or web address]   You will be asked to enter the access code listed below, as well as some personal information. Please follow the directions to create your username and password.     Your access code is: [email or web address]  Expires:  2019  6:31 AM     Your access code will  in 90 days. If you need help or a new code, please contact your [facility] or call [phone] for assistance.        Care EveryWhere ID     This is your Care EveryWhere ID. This could be used by other organizations to access your [facility] medical records  [id number]         Blood Pressure from Last 3 Encounters:   18 (!) 164/101   17 (!) 158/95   17 132/80    Weight from Last 3 Encounters:   18 125 kg (275 lb 9.6 oz)   17 118.4 kg (261 lb)   17 121.5 kg (267 lb 14.4 oz)              Today, you had the following     No orders found for display         Today's Medication Changes          These changes are accurate as of 18  4:37 PM.  If you have any questions, ask your nurse or doctor.               Start taking these medicines.        Dose/Directions    SnocapE 14 DAY READER Rebeca   Used for:  Type 1 diabetes mellitus with hyperglycemia (H)   Started by:  Soraya Corbin RN        Dose:  1 each   1 each every 14 days   Quantity:  1 Device   Refills:  1         These medicines have changed or have updated prescriptions.        Dose/Directions    * blood glucose monitoring test strip   Commonly known as:  TRINA CONTOUR NEXT   This may have changed:  Another medication with the same name was added. Make sure you understand how and when to take each.   Used for:  Type 1 diabetes mellitus without complication (H)   Changed by:  Soraya Corbin RN        Test for times daily as directed   Quantity:  400 strip   Refills:  3       * blood glucose monitoring test strip   Commonly known as:  no brand specified   This may have changed:  You were already taking a medication with the same name, and this prescription was added. Make sure you understand how and when to take each.   Used for:  Type 1 diabetes mellitus with hyperglycemia (H)   Changed by:  Soraya Corbin RN        Use to test  blood sugars 1 times daily or as directed Precision Neil   Quantity:  25 strip   Refills:  3       * continuous blood glucose monitoring sensor   This may have changed:  Another medication with the same name was added. Make sure you understand how and when to take each.   Used for:  Type 1 diabetes mellitus with complication (H)   Changed by:  Soraya Corbin RN        For use with Freestyle Concepcion Flash  for continuous monitioring of blood glucose levels. Replace sensor every 10 days.   Quantity:  3 each   Refills:  11       * FREESTYLE CONCEPCION 14 DAY SENSOR Misc   This may have changed:  You were already taking a medication with the same name, and this prescription was added. Make sure you understand how and when to take each.   Used for:  Type 1 diabetes mellitus with hyperglycemia (H)   Changed by:  Soraya Corbin RN        Dose:  1 each   1 each every 14 days   Quantity:  2 each   Refills:  11       * Notice:  This list has 4 medication(s) that are the same as other medications prescribed for you. Read the directions carefully, and ask your doctor or other care provider to review them with you.         Where to get your medicines      These medications were sent to [facility]/pharmacy [address] [place] [address]     Phone:  [phone]     FREESTYLE CONCEPCION 14 DAY READER Rebeca    FREESTYLE CONCEPCION 14 DAY SENSOR Misc         Some of these will need a paper prescription and others can be bought over the counter.  Ask your nurse if you have questions.     Bring a paper prescription for each of these medications     blood glucose monitoring test strip                Primary Care Provider    None Specified       No primary provider on file.        Equal Access to Services     [facility] : Hadisidro hajio Sonohelia, waaxda luqadaha, qaybta kaalmada renaldo, nasir garvey. So [facility] [phone].    ATENCIÓN: Lina solano,  tiene a [place] disposición servicios gratuitos de asistencia lingüística. Gwendolyn cota [phone].    We comply with applicable federal civil rights laws and [place] laws. We do not discriminate on the basis of race, color, national origin, age, disability, sex, sexual orientation, or gender identity.            Thank you!     Thank you for choosing [facility] DIABETES  for your care. Our goal is always to provide you with excellent care. Hearing back from our patients is one way we can continue to improve our services. Please take a few minutes to complete the written survey that you may receive in the mail after your visit with us. Thank you!             Your Updated Medication List - Protect others around you: Learn how to safely use, store and throw away your medicines at www.disposemymeds.org.          This list is accurate as of 11/26/18  4:37 PM.  Always use your most recent med list.                   Brand Name Dispense Instructions for use Diagnosis    * blood glucose monitoring test strip    TRINA CONTOUR NEXT    400 strip    Test for times daily as directed    Type 1 diabetes mellitus without complication (H)       * blood glucose monitoring test strip    no brand specified    25 strip    Use to test blood sugars 1 times daily or as directed Precision Neil    Type 1 diabetes mellitus with hyperglycemia (H)       cetirizine 10 MG tablet    zyrTEC     Take 10 mg by mouth daily        * continuous blood glucose monitoring sensor     3 each    For use with Freestyle Concepcion Flash  for continuous monitioring of blood glucose levels. Replace sensor every 10 days.    Type 1 diabetes mellitus with complication (H)       * FREESTYLE CONCEPCION 14 DAY SENSOR Misc     2 each    1 each every 14 days    Type 1 diabetes mellitus with hyperglycemia (H)       dextroamphetamine 10 MG tablet    DEXTROSTAT     Take 10 mg by mouth daily        FREESTYLE CONCEPCION 14 DAY READER Rebeca     1 Device    1 each every 14 days    Type 1  "diabetes mellitus with hyperglycemia (H)       furosemide 10 mg Tabs half-tab    LASIX     Take 10 mg by mouth daily        insulin glargine 100 UNIT/ML pen     60 mL    Inject 54 units subcutaneous each am and 10 units subcutaneous at bedtime.        insulin lispro 100 UNIT/ML vial    humaLOG    60 mL    Inject premeals and for snacks and correction . Pt uses approx 60 units in 24 hrs.,    Type 1 diabetes mellitus with other specified complication (H)       insulin syringe 31G X 5/16\" 0.5 ML Misc     100 each    Use twice daily    Type 1 diabetes mellitus with hyperglycemia (H)       losartan 100 MG tablet    COZAAR    30 tablet    Take 1 tablet (100 mg) by mouth daily    Type 1 diabetes mellitus with complication (H)       ONETOUCH DELICA LANCETS 33G Misc     400 each    1 lancet 4 times daily    Type 1 diabetes mellitus with hyperglycemia (H)       priLOSEC OTC 20 MG EC tablet   Generic drug:  omeprazole      Take by mouth every evening        VYVANSE 10 MG capsule   Generic drug:  lisdexamfetamine      Take 10 mg by mouth every morning        * Notice:  This list has 4 medication(s) that are the same as other medications prescribed for you. Read the directions carefully, and ask your doctor or other care provider to review them with you.      "

## 2018-11-27 NOTE — PROCEDURES
"Diabetes Self-Management Education & Support    Diabetes Education Self Management & Training    SUBJECTIVE/OBJECTIVE:     Cultural Influences/Ethnic Background:        Diabetes Symptoms & Complications  none     Patient Problem List and Family Medical History reviewed for relevant medical history, current medical status, and diabetes risk factors.    Vitals:    Estimated body mass index is 33.99 kg/(m^2) as calculated from the following:    Height as of 11/8/18: 1.918 m (6' 3.5\").    Weight as of 11/8/18: 125 kg (275 lb 9.6 oz).   Last 3 BP:   BP Readings from Last 3 Encounters:   11/08/18 (!) 164/101   12/04/17 (!) 158/95   06/16/17 132/80       History   Smoking Status     Former Smoker   Smokeless Tobacco     Never Used     Comment: 20 YEARS AGO       Labs:  Lab Results   Component Value Date    A1C 9.1 03/23/2015     Lab Results   Component Value Date     04/06/2015     Lab Results   Component Value Date    LDL 53 04/14/2015    LDL 66 11/21/2013     HDL Cholesterol   Date Value Ref Range Status   11/21/2013 53 40 - 110 mg/dL Final   ]  GFR Estimate   Date Value Ref Range Status   06/16/2017 >90  Non  GFR Calc   >60 mL/min/1.7m2 Final     GFR Estimate If Black   Date Value Ref Range Status   06/16/2017 >90   GFR Calc   >60 mL/min/1.7m2 Final     Lab Results   Component Value Date    CR 0.80 06/16/2017     No results found for: MICROALBUMIN    Healthy Eating  Healthy Eating Assessed Today: Yes  Cultural/Rastafarian diet restrictions?: No  Patient on a regular basis: Eats 3 meals a day, Counts carbohydrates  Breakfast: cereal or oatmeal or bagel (11am)  Lunch: soup and sandwich (3p)  Dinner: turkey soup or hotdish (7pm)  Snacks: carrots,celery,yogurt  Beverages: Water, Coffee, Diet soda  Has patient met with a dietitian in the past?: Yes    Being Active  Being Active Assessed Today: Yes  Exercise:: Yes  Days per week of moderate to strenuous exercise (like a brisk " walk): 3  On average, minutes per day of exercise at this level: 20  How intense was your typical exercise? : Light (like stretching or slow walking)  Exercise Minutes per Week: 60  Barrier to exercise: None    Monitoring  Monitoring Assessed Today: Yes  Did patient bring glucose meter to appointment? : Yes  Blood Glucose Meter: CGM  Home Glucose (Sugar) Monitorin+ times per day  Low Glucose Range (mg/dL): <70  High Glucose Range (mg/dL): >200  Overall Range (mg/dL): 110-130    Taking Medications  Diabetes Medication(s)     Insulin Sig    BASAGLAR 100 UNIT/ML injection Inject 54 units subcutaneous each am and 10 units subcutaneous at bedtime.    insulin lispro (HUMALOG) 100 UNIT/ML injection Inject premeals and for snacks and correction . Pt uses approx 60 units in 24 hrs.,        Taking Medication Assessed Today: Yes  Current Treatments: Insulin Injections  Dose schedule: pre-breakfast, pre-lunch, pre-dinner  Given by: Patient  Injection/Infusion sites: Abdomen  Problems taking diabetes medications regularly?: Yes  Diabetes medication side effects?: Yes  Treatment Compliance: All of the time    Problem Solving  Problem Solving Assessed Today: Yes  Hypoglycemia Frequency: Weekly  Hypoglycemia Treatment: Glucose (tablets or gel), Other food  Patient carries a carbohydrate source: Yes    Hypoglycemia symptoms  Confusion: No  Dizziness or Light-Headedness: No  Headaches: No  Hunger: Yes  Sweats: Yes  Tremors: Yes    Healthy Coping     Patient Activation Measure Survey Score:  No flowsheet data found.    ASSESSMENT:  Longstanding type 1 diabetes, has worn a pump in the past, would like to go back on one    Patient's most recent   Lab Results   Component Value Date    A1C 9.1 2015    is not meeting goal of <7.0    INTERVENTION:   Education provided today on:    Reviewed and demonstrated operation of the following pumps:  The Omnipod 400, Medtronic 670G with Guardian 3 sensor, and Tandem X2 with Dexcom G6  "sensor.     Discussed unique features of each pump and what qualities are most important to patient.  Discussion included the operation of the 670G Hybrid Closed Loop system and the particular behaviors around that pump that need to be adhered to, including using the bolus calculator, counting carbohydrates accurately, calibrating the sensor appropriately.  Discussed that the results seen in the studies of the system that were done were a result of staying in \"auto\" mode > 85% of the time.      Explained the upgrade process, which includes making sure that warranty has  on the current pump, completing the CMN, processing through the pump company and approval by insurance company.  Also reviewed training that would be necessary to ensure safe operation of the pump.        Opportunities for ongoing education and support in diabetes-self management were discussed.    Pt verbalized understanding of concepts discussed and recommendations provided today.      We went online today and submitted paperwork for the T:Slim X2.  This is just to check coverage.       PLAN:  See Patient Instructions for co-developed, patient-stated behavior change goals.  AVS printed and provided to patient today. See Follow-Up section for recommended follow-up.      Time Spent: 60 minutes  Encounter Type: Individual    Any diabetes medication dose changes were made via the CDE Protocol and Collaborative Practice Agreement with the patient's referring provider. A copy of this encounter was shared with the provider.    "

## 2018-11-30 ENCOUNTER — DOCUMENTATION ONLY (OUTPATIENT)
Dept: ENDOCRINOLOGY | Facility: CLINIC | Age: 53
End: 2018-11-30

## 2018-11-30 NOTE — PROGRESS NOTES
[facility] CLINICAL DOCUMENTATION    Form Documentation Form or Letter Request    Type or form/letter needing completion: Dexcom  Provider: Lonnie  Has provider seen patient for office visit related to reason for form request? Yes  Once completed: Fax form to: Sarah

## 2018-12-03 DIAGNOSIS — E10.65 TYPE 1 DIABETES MELLITUS WITH HYPERGLYCEMIA (H): ICD-10-CM

## 2018-12-04 RX ORDER — NAPROXEN SODIUM 220 MG
TABLET ORAL
Qty: 100 EACH | Refills: 11 | Status: SHIPPED | OUTPATIENT
Start: 2018-12-04

## 2018-12-18 ENCOUNTER — DOCUMENTATION ONLY (OUTPATIENT)
Dept: ENDOCRINOLOGY | Facility: CLINIC | Age: 53
End: 2018-12-18

## 2018-12-18 NOTE — PROGRESS NOTES
[facility] CLINICAL DOCUMENTATION    Form Documentation Form or Letter Request    Type or form/letter needing completion: Tslimx2  Provider: Lonnie  Has provider seen patient for office visit related to reason for form request? Yes  Once completed: Fax form to: Martinez

## 2019-03-06 DIAGNOSIS — E10.69 TYPE 1 DIABETES MELLITUS WITH OTHER SPECIFIED COMPLICATION (H): ICD-10-CM

## 2019-03-06 NOTE — TELEPHONE ENCOUNTER
insulin lispro (HUMALOG) 100 UNIT/ML injection  Last Written Prescription Date:  11/02/18  Last Fill Quantity: 60,   # refills: 1  Last Office Visit : 11/8/18  Future Office visit:  none    Routing refill request to provider for review/approval because:  Insulin - refilled per clinic

## 2019-03-28 ENCOUNTER — TELEPHONE (OUTPATIENT)
Dept: ENDOCRINOLOGY | Facility: CLINIC | Age: 54
End: 2019-03-28

## 2019-03-28 DIAGNOSIS — E10.69 TYPE 1 DIABETES MELLITUS WITH OTHER SPECIFIED COMPLICATION (H): ICD-10-CM

## 2019-03-28 NOTE — TELEPHONE ENCOUNTER
Since switching to pump, Using twice as much insulin as he was expecting   2x as much or more but much better control.     Bolus about 45-60 daily plus basal at 2units/hour   Daughter hospitalized for over a month - cannot link glucose results to clinic - but results have been considerably better - averaging 120- 130 during day, overnights highs 220-250, lowest is 45-50 very infrequently.

## 2019-03-28 NOTE — TELEPHONE ENCOUNTER
"Pharmacy Contact     Telephone Fax   [phone]   Pharmacy Address and Hours     Address Hours   [address] [facility] [address]      Pharmacy refusing to fill insulin order as \"too soon\"     insulin lispro (HUMALOG) 100 UNIT/ML vial 120 mL 3 3/28/2019  No   Sig: Inject premeals and for snacks and correction . Pt uses approx 120 units in 24 hrs.,   Sent to pharmacy as: insulin lispro (HUMALOG) 100 UNIT/ML vial   Class: E-Prescribe   Order: [id number]   E-Prescribing Status: Receipt confirmed by pharmacy (3/28/2019 12:14 PM CDT)   Printout Tracking     External Result Report   Medication Administration Instructions     Inject premeals and for snacks and correction . Pt uses approx 120 units in 24 hrs.,   Pharmacy     [id number] - [place], [place] - [address] [facility]     Pharm D Lanre confirmed that Rx is \"ok\" to dispense and will contact Pt.     Pt notified.   "

## 2019-04-01 ENCOUNTER — TELEPHONE (OUTPATIENT)
Dept: ENDOCRINOLOGY | Facility: CLINIC | Age: 54
End: 2019-04-01

## 2019-04-01 NOTE — TELEPHONE ENCOUNTER
Prior Authorization Retail Medication Request    Medication/Dose: BASAGLAR 100 UNIT/ML injection. Inject 54 units subcutaneous each am and 10 units subcutaneous at bedtime    ICD code: E10.69     Rationale:  Patient needs Basaglar to help control frequent high blood sugars. Has been taking the medication and tolerating well.     Insurance Name: [email or web address] ACCESS    Insurance ID: [id number]        Pharmacy Information   Name:  [facility] pharmacy   Phone:  [phone]

## 2019-04-01 NOTE — TELEPHONE ENCOUNTER
Central Prior Authorization Team   Phone: [phone]    PA Initiation    Medication: BASAGLAR 100 UNIT/ML injection  Insurance Company: [employer] - Phone [phone] Fax [phone]  Pharmacy Filling the Rx: [id number] - [place], [place] - [address] [place] [address]  Filling Pharmacy Phone: [phone]  Filling Pharmacy Fax: [phone]  Start Date: 4/1/2019

## 2019-04-04 NOTE — TELEPHONE ENCOUNTER
PRIOR AUTHORIZATION DENIED    Medication: BASAGLAR 100 UNIT/ML injection - P/A DENIED    Denial Date: 4/4/2019    Denial Rational:           Appeal Information:

## 2019-11-25 ENCOUNTER — TELEPHONE (OUTPATIENT)
Dept: ENDOCRINOLOGY | Facility: CLINIC | Age: 54
End: 2019-11-25

## 2019-11-26 NOTE — TELEPHONE ENCOUNTER
Prior Authorization Retail Medication Request    Medication/Dose: Freestyle Concepcion sensors   ICD code (if different than what is on RX):E10.65  Rationale:Patient needs to communicate with freestyle concepcion device     Insurance Name:[employer]   Insurance ID:[id number]       Pharmacy Information (if different than what is on RX)  Name:    Phone:

## 2019-11-27 NOTE — TELEPHONE ENCOUNTER
Concepcion is excluded from Pharmacy Benefit, will attempt to submit to medical via [employer] site.    PA Initiation    Medication: Freestyle Concepcion sensors -   Insurance Company: [place] Medicaid ([facility]) - Phone [phone] Fax [phone]  Pharmacy Filling the Rx: [id number] - [place], [place] - [address] [address]  Filling Pharmacy Phone: [phone]  Filling Pharmacy Fax: [phone]  Start Date: 11/27/2019

## 2019-12-06 ENCOUNTER — TELEPHONE (OUTPATIENT)
Dept: ENDOCRINOLOGY | Facility: CLINIC | Age: 54
End: 2019-12-06

## 2019-12-06 NOTE — TELEPHONE ENCOUNTER
Prior Authorization Not Needed per Insurance    Medication: Freestyle Conecpcion sensors - Not Needed  Insurance Company: [place] Medicaid ([facility]) - Phone [phone] Fax [phone]  Expected CoPay:      Pharmacy Filling the Rx: [id number] - [place], [place] - [address] [place] [address]  Pharmacy Notified: Yes  Patient Notified: Comment:  **Instructed pharmacy to notify patient when script is ready to /ship.**

## 2020-02-10 ENCOUNTER — PATIENT OUTREACH (OUTPATIENT)
Dept: ENDOCRINOLOGY | Facility: CLINIC | Age: 55
End: 2020-02-10

## 2020-02-10 DIAGNOSIS — E10.69 TYPE 1 DIABETES MELLITUS WITH OTHER SPECIFIED COMPLICATION (H): ICD-10-CM

## 2020-02-10 NOTE — TELEPHONE ENCOUNTER
insulin lispro (HUMALOG) 100 UNIT/ML vial        Last Written Prescription Date:  03/28/19  Last Fill Quantity: 120mL,   # refills: 3  Last Office Visit : 11/08/18  Future Office visit:  None scheduled    Routing refill request to provider for review/approval because:  Insulin - refilled per clinic

## 2021-05-28 ENCOUNTER — RECORDS - HEALTHEAST (OUTPATIENT)
Dept: ADMINISTRATIVE | Facility: CLINIC | Age: 56
End: 2021-05-28

## 2021-05-29 ENCOUNTER — RECORDS - HEALTHEAST (OUTPATIENT)
Dept: ADMINISTRATIVE | Facility: CLINIC | Age: 56
End: 2021-05-29